# Patient Record
Sex: MALE | Race: WHITE
[De-identification: names, ages, dates, MRNs, and addresses within clinical notes are randomized per-mention and may not be internally consistent; named-entity substitution may affect disease eponyms.]

---

## 2021-08-06 ENCOUNTER — HOSPITAL ENCOUNTER (INPATIENT)
Dept: HOSPITAL 53 - M ED | Age: 21
LOS: 6 days | Discharge: HOME | DRG: 392 | End: 2021-08-12
Attending: INTERNAL MEDICINE | Admitting: FAMILY MEDICINE
Payer: COMMERCIAL

## 2021-08-06 VITALS — WEIGHT: 186.73 LBS | HEIGHT: 70 IN | BODY MASS INDEX: 26.73 KG/M2

## 2021-08-06 DIAGNOSIS — R16.1: ICD-10-CM

## 2021-08-06 DIAGNOSIS — R00.1: ICD-10-CM

## 2021-08-06 DIAGNOSIS — N17.9: ICD-10-CM

## 2021-08-06 DIAGNOSIS — A08.4: Primary | ICD-10-CM

## 2021-08-06 DIAGNOSIS — F17.210: ICD-10-CM

## 2021-08-06 DIAGNOSIS — M62.82: ICD-10-CM

## 2021-08-06 DIAGNOSIS — D69.6: ICD-10-CM

## 2021-08-06 LAB
ALBUMIN SERPL BCG-MCNC: 3.9 GM/DL (ref 3.2–5.2)
ALT SERPL W P-5'-P-CCNC: 34 U/L (ref 12–78)
BASOPHILS # BLD AUTO: 0 10^3/UL (ref 0–0.2)
BASOPHILS NFR BLD AUTO: 0.2 % (ref 0–1)
BILIRUB CONJ SERPL-MCNC: 0.1 MG/DL (ref 0–0.2)
BILIRUB SERPL-MCNC: 0.5 MG/DL (ref 0.2–1)
CK SERPL-CCNC: 360 U/L (ref 39–308)
EOSINOPHIL # BLD AUTO: 0 10^3/UL (ref 0–0.5)
EOSINOPHIL NFR BLD AUTO: 0.1 % (ref 0–3)
HCT VFR BLD AUTO: 44.3 % (ref 42–52)
HGB BLD-MCNC: 15.3 G/DL (ref 13.5–17.5)
LIPASE SERPL-CCNC: 112 U/L (ref 73–393)
LYMPHOCYTES # BLD AUTO: 0.8 10^3/UL (ref 1.5–5)
LYMPHOCYTES NFR BLD AUTO: 8.4 % (ref 24–44)
MCH RBC QN AUTO: 31.5 PG (ref 27–33)
MCHC RBC AUTO-ENTMCNC: 34.5 G/DL (ref 32–36.5)
MCV RBC AUTO: 91.2 FL (ref 80–96)
MONOCYTES # BLD AUTO: 1 10^3/UL (ref 0–0.8)
MONOCYTES NFR BLD AUTO: 9.9 % (ref 2–8)
NEUTROPHILS # BLD AUTO: 7.9 10^3/UL (ref 1.5–8.5)
NEUTROPHILS NFR BLD AUTO: 80.9 % (ref 36–66)
PLATELET # BLD AUTO: 156 10^3/UL (ref 150–450)
PROT SERPL-MCNC: 6.9 GM/DL (ref 6.4–8.2)
RBC # BLD AUTO: 4.86 10^6/UL (ref 4.3–6.1)
WBC # BLD AUTO: 9.7 10^3/UL (ref 4–10)

## 2021-08-07 VITALS — DIASTOLIC BLOOD PRESSURE: 86 MMHG | SYSTOLIC BLOOD PRESSURE: 180 MMHG

## 2021-08-07 VITALS — DIASTOLIC BLOOD PRESSURE: 67 MMHG | SYSTOLIC BLOOD PRESSURE: 149 MMHG

## 2021-08-07 VITALS — SYSTOLIC BLOOD PRESSURE: 156 MMHG | OXYGEN SATURATION: 97 % | DIASTOLIC BLOOD PRESSURE: 87 MMHG

## 2021-08-07 VITALS — DIASTOLIC BLOOD PRESSURE: 71 MMHG | SYSTOLIC BLOOD PRESSURE: 151 MMHG

## 2021-08-07 LAB
BUN SERPL-MCNC: 32 MG/DL (ref 7–18)
CALCIUM SERPL-MCNC: 8.1 MG/DL (ref 8.5–10.1)
CHLORIDE SERPL-SCNC: 111 MEQ/L (ref 98–107)
CO2 SERPL-SCNC: 23 MEQ/L (ref 21–32)
CREAT SERPL-MCNC: 3.98 MG/DL (ref 0.7–1.3)
GFR SERPL CREATININE-BSD FRML MDRD: 20.2 ML/MIN/{1.73_M2} (ref 60–?)
GLUCOSE SERPL-MCNC: 93 MG/DL (ref 70–100)
HETEROPH AB SER QL LA: NEGATIVE
MAGNESIUM SERPL-MCNC: 2.4 MG/DL (ref 1.8–2.4)
PHOSPHATE SERPL-MCNC: 4.3 MG/DL (ref 2.5–4.9)
POTASSIUM SERPL-SCNC: 4.4 MEQ/L (ref 3.5–5.1)
RSV RNA NPH QL NAA+PROBE: NEGATIVE
SODIUM SERPL-SCNC: 140 MEQ/L (ref 136–145)

## 2021-08-07 RX ADMIN — SODIUM CHLORIDE SCH UNITS: 4.5 INJECTION, SOLUTION INTRAVENOUS at 20:53

## 2021-08-07 RX ADMIN — DOCUSATE SODIUM SCH MG: 100 CAPSULE, LIQUID FILLED ORAL at 07:55

## 2021-08-07 RX ADMIN — DOCUSATE SODIUM SCH MG: 100 CAPSULE, LIQUID FILLED ORAL at 20:54

## 2021-08-07 RX ADMIN — HYDROMORPHONE HYDROCHLORIDE PRN MG: 1 INJECTION, SOLUTION INTRAMUSCULAR; INTRAVENOUS; SUBCUTANEOUS at 17:31

## 2021-08-07 RX ADMIN — HYDROMORPHONE HYDROCHLORIDE PRN MG: 1 INJECTION, SOLUTION INTRAMUSCULAR; INTRAVENOUS; SUBCUTANEOUS at 10:41

## 2021-08-07 RX ADMIN — HYDROMORPHONE HYDROCHLORIDE PRN MG: 1 INJECTION, SOLUTION INTRAMUSCULAR; INTRAVENOUS; SUBCUTANEOUS at 14:02

## 2021-08-07 RX ADMIN — SODIUM CHLORIDE, POTASSIUM CHLORIDE, SODIUM LACTATE AND CALCIUM CHLORIDE SCH MLS/HR: 600; 310; 30; 20 INJECTION, SOLUTION INTRAVENOUS at 20:21

## 2021-08-07 RX ADMIN — SODIUM CHLORIDE, POTASSIUM CHLORIDE, SODIUM LACTATE AND CALCIUM CHLORIDE SCH MLS/HR: 600; 310; 30; 20 INJECTION, SOLUTION INTRAVENOUS at 12:41

## 2021-08-07 RX ADMIN — NICOTINE SCH PATCH: 14 PATCH, EXTENDED RELEASE TRANSDERMAL at 09:57

## 2021-08-07 RX ADMIN — SODIUM CHLORIDE SCH MLS/HR: 9 INJECTION, SOLUTION INTRAVENOUS at 10:02

## 2021-08-07 RX ADMIN — SODIUM CHLORIDE SCH MLS/HR: 9 INJECTION, SOLUTION INTRAVENOUS at 03:02

## 2021-08-07 RX ADMIN — SODIUM CHLORIDE SCH UNITS: 4.5 INJECTION, SOLUTION INTRAVENOUS at 14:06

## 2021-08-07 RX ADMIN — KETOROLAC TROMETHAMINE PRN MG: 30 INJECTION, SOLUTION INTRAMUSCULAR at 04:33

## 2021-08-07 RX ADMIN — HYDROMORPHONE HYDROCHLORIDE PRN MG: 1 INJECTION, SOLUTION INTRAMUSCULAR; INTRAVENOUS; SUBCUTANEOUS at 20:53

## 2021-08-07 RX ADMIN — CEFTRIAXONE SCH MLS/HR: 1 INJECTION, POWDER, FOR SOLUTION INTRAMUSCULAR; INTRAVENOUS at 03:03

## 2021-08-07 RX ADMIN — KETOROLAC TROMETHAMINE PRN MG: 30 INJECTION, SOLUTION INTRAMUSCULAR at 04:07

## 2021-08-07 NOTE — REPVR
PROCEDURE INFORMATION: 

Exam: US Retroperitoneal; Complete; Kidneys and Bladder 

Exam date and time: 8/7/2021 5:59 AM 

Age: 20 years old 

Clinical indication: Condition or disease; Other: Jeff 



TECHNIQUE: 

Imaging protocol: Real-time ultrasound of the retroperitoneum with image 

documentation. Complete exam focused on the kidneys and bladder. 



COMPARISON: 

CT ABD PELVIS W/O CONTRAST 8/7/2021 12:31 AM 



FINDINGS: 

Right kidney: Right kidney measures 13.3 x 5.8 x 6.3 cm. Diffuse increased 

parenchymal echogenicity with accentuated corticomedullary differentiation. No 

hydronephrosis. No mass or calculi visualized. 

Left kidney: Left kidney measures 13.6 x 5.8 x 6.5 cm. Diffuse increased 

parenchymal echogenicity with accentuated corticomedullary differentiation. No 

hydronephrosis. No mass or calculi visualized. 

Urinary bladder: Partially decompressed but otherwise unremarkable. 



IMPRESSION: 

1. Bilateral echogenic kidneys, consistent with medical-renal disease. 

2. No hydronephrosis. 



Electronically signed by: Morales Ernst On 08/07/2021  08:03:51 AM

## 2021-08-07 NOTE — HPEPDOC
[facility] Medical History & Physical


Date of Admission


Aug 7, 2021


Date of Service:  Aug 7, 2021





History and Physical


CHIEF COMPLAINT: abdominal pain 





HISTORY OF PRESENT ILLNESS: 19 yo M with no significant PMHx, presented to ER 

c/o worsening lower abominal pain and bilateral flank pain since this morning. 

The pain started as mild, but progressed to 10/10 after eating lunch. He denies 

fevers, chills, melena, chest pain, hematochezia or dysuria. He vomited once, 

bilious contents. Patient states strenous physical activity in the , as 

he is training to enter r[employer] program. In the ER, found to be afebrile, without

leukocytosis. CK mildly elevated at 360. Cr. 2.9 BUN 28. CT abdomen showing 

splenomegaly and perinephric fat stranding suggestive of pyelonephritis. UA+ 

blood, LE, WBC. Patient was started on IV ceftriaxone and IVF. Will be admitted 

to hospitalist service for management of abdminal pain 2/2 possible 

pyelonephritis vs rhabdomyolysis. 





PAST MEDICAL HISTORY:


na





SOCIAL HISTORY:


smokes cigarettes occasionally


drinks etoh on weekends


no illicit drug use


active 





FAMILY HISTORY:


reviewed with patient, no relevant family hx provided





ALLERGIES: Please see below.





REVIEW OF SYSTEMS:


10 point ROS completed, relevant findings in HPI





HOME MEDICATIONS: Please see below. 





PHYSICAL EXAMINATION:


VITAL SIGNS: please see below


GENERAL APPEARANCE: in pain, non toxic


HEENT: PERRLA


CARDIOVASCULAR: RRR, normal S1, S2


LUNGS: CTAB, good air entry bl


ABDOMEN: soft, pain to palpation of lower abdo, mild CVA tenderness, 

splenomegaly.


EXTREMITIES: no deformity, no edema


NEUROLOGICAL: no focal neuro deficits, CN2-12 intact, speech clear, no facial 

asymmetry





LABORATORY DATA: See below.





IMAGING: 





CT abdo pelvis wo contrast (8/7/21):


1. Right renal and upper ureteral adjacent fat stranding, correlate with 


urinalysis for pyelonephritis. 


2. Normal appendix on coronal image 40. 


3. Splenomegaly, measures 15 cm. 





MICROBIOLOGY: Please see below. 





ASSESSMENT: 19 yo M with no significant PMHx, presented to ER c/o worsening 

lower abominal pain and bilateral flank pain since this morning. The pain 

started as mild, but progressed to 10/10 after eating lunch. He denies fevers, 

chills, melena, chest pain, hematochezia or dysuria. He vomited once, bilious 

contents. Patient states strenous physical activity in the , as he is 

training to enter [employer] program. In the ER, found to be afebrile, without 

leukocytosis. CK mildly elevated at 360. Cr. 2.9 BUN 28. CT abdomen showing 

perinephric fat stranding suggestive of pyelonephritis. UA+ blood, LE, WBC. 

Patient was started on IV ceftriaxone and IVF. Will be admitted to hospitalist 

service for management of abdminal pain 2/2 possible pyelonephritis vs 

rhabdomyolysis. In light of abdominal pain and splenomegaly, will check 

mononucleosis screen.





.


PLAN:


# Abdo pain 2/2 possible pyelonephritis vs rhabdomyolsysi vs mononucleosis


- Afebrile, without leukocytosis


- CT showing findings c/w possible pyelo, but also showing splenomegaly. 


- UA suggestive of infection, will start ceftriaxone 1g q24h


- CK only mildly elevated, possibly start of rhabdo. C/w IVF


- given splenomegaly on CT, will check mononucleosis screen


- advised patient to avoid physical contact sports


- pain control with IV morphine





Dispo: pending clinical improvement





Vital Signs





Vital Signs








  Date Time  Temp Pulse Resp B/P (MAP) Pulse Ox O2 Delivery O2 Flow Rate FiO2


 


8/7/21 01:33   16  97 Room Air  


 


8/7/21 00:09 97.7 66  162/80 (107)    











Laboratory Data


Labs 24H


Laboratory Tests 2


8/6/21 21:52: 


Immature Granulocyte % (Auto) 0.5, Neutrophils (%) (Auto) 80.9H, Lymphocytes (%)

(Auto) 8.4L, Monocytes (%) (Auto) 9.9H, Eosinophils (%) (Auto) 0.1, Basophils (%

) (Auto) 0.2, Neutrophils # (Auto) 7.9, Lymphocytes # (Auto) 0.8L, Monocytes # 

(Auto) 1.0H, Eosinophils # (Auto) 0.0, Basophils # (Auto) 0.0, Nucleated Red 

Blood Cells % (auto) 0.0, Total Bilirubin 0.5, Direct Bilirubin 0.1, Aspartate 

Amino Transf (AST/SGOT) 37, Alanine Aminotransferase (ALT/SGPT) 34, Alkaline 

Phosphatase 97, Total Creatine Kinase 360H, Total Protein 6.9, Albumin 3.9, 

Albumin/Globulin Ratio 1.3, Lipase 112


8/6/21 22:02: 


POC Glucose (Misc Panel) 103, POC Sodium (Misc Panel) 141, POC Potassium (Misc 

Panel) 3.6, POC Chloride (Misc Panel) 104, POC Total CO2 (Misc Panel) 23.0, POC 

Blood Urea Nitrogen (Misc Panel 28H, POC Ionized Calcium (Misc Panel) 4.9, POC 

Creatinine (Misc Panel) 2.9H, POC Hematocrit (Misc Panel) 44.0


8/7/21 00:28: 


Coronavirus (COVID-19)(PCR) NEGATIVE, Influenza Type A (RT-PCR) NEGATIVE, 

Influenza Type B (RT-PCR) NEGATIVE, Respiratory Syncytial Virus (PCR) NEGATIVE


8/7/21 01:53: 


Urine Color YELLOW, Urine Appearance HAZY, Urine pH 6.0, Urine Specific Gravity 

1.007, Urine Protein 3+H, Urine Glucose (UA) 1+H, Urine Ketones NEGATIVE, Urine 

Blood 1+H, Urine Nitrite NEGATIVE, Urine Bilirubin NEGATIVE, Urine Urobilinogen 

0.2, Urine Leukocyte Esterase 1+H, Urine WBC (Auto) 41H, Urine RBC (Auto) 6H, 

Urine Hyaline Casts (Auto) 1, Urine Bacteria (Auto) NEGATIVE, Urine Squamous 

Epithelial Cells 0, Urine Transitional Epithelial Cells <1, Urine Mucus (Auto) 

SMALL, Urine Sperm (Auto)


CBC/BMP


Laboratory Tests


8/6/21 21:52








Microbiology





Microbiology


8/7/21 Urine Culture, Received


         Pending





Home Medications


No Active Prescriptions or Reported Meds





Allergies


Coded Allergies:  


     No Known Allergies (Unverified , 8/6/21)











CHANDAN REYNOSO MD        Aug 7, 2021 03:01

## 2021-08-07 NOTE — REPVR
PROCEDURE INFORMATION: 

Exam: CT Abdomen And Pelvis Without Contrast 

Exam date and time: 8/7/2021 12:33 AM 

Age: 20 years old 

Clinical indication: Abdominal pain; Localized; Right lower quadrant (rlq); 

Additional info: R/O appy, fabian, rhabdo 



TECHNIQUE: 

Imaging protocol: Computed tomography of the abdomen and pelvis without 

contrast. 

Radiation optimization: All CT scans at this facility use at least one of these 

dose optimization techniques: automated exposure control; mA and/or kV 

adjustment per patient size (includes targeted exams where dose is matched to 

clinical indication); or iterative reconstruction. 



COMPARISON: 

No relevant prior studies available. 



FINDINGS: 

Liver: Normal. No mass. 

Gallbladder and bile ducts: Normal. No calcified stones. No ductal dilation. 

Pancreas: Normal. No ductal dilation. 

Spleen: Splenomegaly, measures 15 cm. 

Adrenal glands: Normal. No mass. 

Kidneys and ureters: Right renal and upper ureteral adjacent fat stranding, 

correlate with urinalysis for pyelonephritis. No renal hydronephrosis. 

Stomach and bowel: Unremarkable. No obstruction. No mucosal thickening. 

Appendix: Normal appendix on coronal image 40. 



Intraperitoneal space: Unremarkable. No free air. No significant fluid 

collection. 

Vasculature: Unremarkable. No abdominal aortic aneurysm. 

Lymph nodes: Unremarkable. No enlarged lymph nodes. 

Urinary bladder: Unremarkable as visualized. 

Reproductive: Unremarkable as visualized. 

Bones/joints: Unremarkable. No acute fracture. 

Soft tissues: Unremarkable. 



IMPRESSION: 

1. Right renal and upper ureteral adjacent fat stranding, correlate with 

urinalysis for pyelonephritis. 

2. Normal appendix on coronal image 40. 

3. Splenomegaly, measures 15 cm. 



Electronically signed by: Adrian Grewal On 08/07/2021  01:50:27 AM

## 2021-08-08 VITALS — SYSTOLIC BLOOD PRESSURE: 161 MMHG | DIASTOLIC BLOOD PRESSURE: 77 MMHG

## 2021-08-08 VITALS — SYSTOLIC BLOOD PRESSURE: 144 MMHG | DIASTOLIC BLOOD PRESSURE: 76 MMHG

## 2021-08-08 VITALS — SYSTOLIC BLOOD PRESSURE: 140 MMHG | DIASTOLIC BLOOD PRESSURE: 90 MMHG

## 2021-08-08 VITALS — SYSTOLIC BLOOD PRESSURE: 148 MMHG | DIASTOLIC BLOOD PRESSURE: 98 MMHG

## 2021-08-08 VITALS — OXYGEN SATURATION: 97 %

## 2021-08-08 VITALS — DIASTOLIC BLOOD PRESSURE: 86 MMHG | SYSTOLIC BLOOD PRESSURE: 140 MMHG

## 2021-08-08 VITALS — OXYGEN SATURATION: 98 %

## 2021-08-08 LAB
AMYLASE SERPL-CCNC: 38 U/L (ref 25–115)
APPEARANCE UR: CLEAR
BACTERIA UR QL AUTO: NEGATIVE
BILIRUB UR QL STRIP.AUTO: NEGATIVE
BUN SERPL-MCNC: 38 MG/DL (ref 7–18)
C3 SERPL-MCNC: 99 MG/DL (ref 90–180)
C4 SERPL-MCNC: 18 MG/DL (ref 10–40)
CALCIUM SERPL-MCNC: 7.7 MG/DL (ref 8.5–10.1)
CHLORIDE SERPL-SCNC: 110 MEQ/L (ref 98–107)
CO2 SERPL-SCNC: 23 MEQ/L (ref 21–32)
CREAT SERPL-MCNC: 5.01 MG/DL (ref 0.7–1.3)
CREAT UR-MCNC: 74.2 MG/DL
GFR SERPL CREATININE-BSD FRML MDRD: 15.5 ML/MIN/{1.73_M2} (ref 60–?)
GLUCOSE SERPL-MCNC: 94 MG/DL (ref 70–100)
GLUCOSE UR QL STRIP.AUTO: NEGATIVE MG/DL
HCT VFR BLD AUTO: 37.4 % (ref 42–52)
HGB BLD-MCNC: 12.9 G/DL (ref 13.5–17.5)
HGB UR QL STRIP.AUTO: (no result)
KETONES UR QL STRIP.AUTO: NEGATIVE MG/DL
LEUKOCYTE ESTERASE UR QL STRIP.AUTO: NEGATIVE
LIPASE SERPL-CCNC: 94 U/L (ref 73–393)
MAGNESIUM SERPL-MCNC: 2.3 MG/DL (ref 1.8–2.4)
MCH RBC QN AUTO: 31.7 PG (ref 27–33)
MCHC RBC AUTO-ENTMCNC: 34.5 G/DL (ref 32–36.5)
MCV RBC AUTO: 91.9 FL (ref 80–96)
MUCOUS THREADS URNS QL MICRO: (no result)
NITRITE UR QL STRIP.AUTO: NEGATIVE
PH UR STRIP.AUTO: 5 UNITS (ref 5–9)
PHOSPHATE SERPL-MCNC: 4.1 MG/DL (ref 2.5–4.9)
PLATELET # BLD AUTO: 107 10^3/UL (ref 150–450)
POTASSIUM SERPL-SCNC: 4 MEQ/L (ref 3.5–5.1)
PROT SERPL-MCNC: 5.7 GM/DL (ref 6.4–8.2)
PROT UR QL STRIP.AUTO: NEGATIVE MG/DL
PROT UR-MCNC: 15.2 MG/DL (ref 0–12)
RBC # BLD AUTO: 4.07 10^6/UL (ref 4.3–6.1)
RBC # UR AUTO: 1 /HPF (ref 0–3)
SODIUM SERPL-SCNC: 138 MEQ/L (ref 136–145)
SP GR UR STRIP.AUTO: 1 (ref 1–1.03)
SQUAMOUS #/AREA URNS AUTO: 0 /HPF (ref 0–6)
UROBILINOGEN UR QL STRIP.AUTO: 0.2 MG/DL (ref 0–2)
WBC # BLD AUTO: 7.2 10^3/UL (ref 4–10)
WBC #/AREA URNS AUTO: 6 /HPF (ref 0–3)

## 2021-08-08 RX ADMIN — CEFTRIAXONE SCH MLS/HR: 1 INJECTION, POWDER, FOR SOLUTION INTRAMUSCULAR; INTRAVENOUS at 03:24

## 2021-08-08 RX ADMIN — DIATRIZOATE MEGLUMINE AND DIATRIZOATE SODIUM SCH ML: 600; 100 SOLUTION ORAL; RECTAL at 13:14

## 2021-08-08 RX ADMIN — SODIUM CHLORIDE SCH UNITS: 4.5 INJECTION, SOLUTION INTRAVENOUS at 17:15

## 2021-08-08 RX ADMIN — METRONIDAZOLE SCH MLS/HR: 500 INJECTION, SOLUTION INTRAVENOUS at 18:22

## 2021-08-08 RX ADMIN — SODIUM CHLORIDE, POTASSIUM CHLORIDE, SODIUM LACTATE AND CALCIUM CHLORIDE SCH MLS/HR: 600; 310; 30; 20 INJECTION, SOLUTION INTRAVENOUS at 03:24

## 2021-08-08 RX ADMIN — HYDROMORPHONE HYDROCHLORIDE PRN MG: 1 INJECTION, SOLUTION INTRAMUSCULAR; INTRAVENOUS; SUBCUTANEOUS at 14:50

## 2021-08-08 RX ADMIN — SODIUM CHLORIDE SCH UNITS: 4.5 INJECTION, SOLUTION INTRAVENOUS at 07:15

## 2021-08-08 RX ADMIN — DEXTROSE MONOHYDRATE SCH MG: 50 INJECTION, SOLUTION INTRAVENOUS at 12:22

## 2021-08-08 RX ADMIN — NICOTINE SCH PATCH: 14 PATCH, EXTENDED RELEASE TRANSDERMAL at 07:55

## 2021-08-08 RX ADMIN — HYDROMORPHONE HYDROCHLORIDE PRN MG: 1 INJECTION, SOLUTION INTRAMUSCULAR; INTRAVENOUS; SUBCUTANEOUS at 11:28

## 2021-08-08 RX ADMIN — HYDROMORPHONE HYDROCHLORIDE PRN MG: 1 INJECTION, SOLUTION INTRAMUSCULAR; INTRAVENOUS; SUBCUTANEOUS at 01:02

## 2021-08-08 RX ADMIN — HYDROMORPHONE HYDROCHLORIDE PRN MG: 1 INJECTION, SOLUTION INTRAMUSCULAR; INTRAVENOUS; SUBCUTANEOUS at 07:56

## 2021-08-08 RX ADMIN — DOCUSATE SODIUM SCH MG: 100 CAPSULE, LIQUID FILLED ORAL at 22:02

## 2021-08-08 RX ADMIN — HYDROMORPHONE HYDROCHLORIDE PRN MG: 1 INJECTION, SOLUTION INTRAMUSCULAR; INTRAVENOUS; SUBCUTANEOUS at 18:22

## 2021-08-08 RX ADMIN — HYDROMORPHONE HYDROCHLORIDE PRN MG: 1 INJECTION, SOLUTION INTRAMUSCULAR; INTRAVENOUS; SUBCUTANEOUS at 22:10

## 2021-08-08 RX ADMIN — DIATRIZOATE MEGLUMINE AND DIATRIZOATE SODIUM SCH ML: 600; 100 SOLUTION ORAL; RECTAL at 13:13

## 2021-08-08 RX ADMIN — DOCUSATE SODIUM SCH MG: 100 CAPSULE, LIQUID FILLED ORAL at 07:56

## 2021-08-08 RX ADMIN — HYDROMORPHONE HYDROCHLORIDE PRN MG: 1 INJECTION, SOLUTION INTRAMUSCULAR; INTRAVENOUS; SUBCUTANEOUS at 04:46

## 2021-08-08 NOTE — IPNPDOC
Subjective


Date Seen


The patient was seen on 8/8/21.





Subjective


Chief Complaint/HPI


Patient seen and examined at bedside this morning.  He continues to complain of 

abdominal pain mainly around the umbilical region with associated nausea.  He 

also reported feeling that he is not urinating the amount he should be with the 

fluids that he is receiving.  However, he denies change in frequency, dysuria, 

and changes in stream.  He denies chest pain and shortness of breath.


Other systems


10 point review of system was negative except for what is noted in the HPI





Objective


Physical Examination


Other physical findings


eneral: Lying in bed, no acute distress


Head/Neck/Throat: Trachea midline, mucous membranes moist


Eyes: Sclera anicteric, no erythema or discharge appreciated bilaterally


Thorax: Normal respiratory effort on room air, lungs clear to auscultation 

bilaterally, no wheezes/rales/rhonchi


Cardiovascular: Normal rate, regular rhythm, normal S1, S2; no S3, S4, 

rubs/gallops/murmurs


Abdomen: Bowel sounds present, soft, tenderness reported with palpation which is

diffuse


Genitourinary: No CVA tenderness, no Lainez in place


Musculoskeletal: Moving all extremities, no edema


Skin: Warm, dry


Neurologic: AAOx3, speech fluent and goal-directed, no focal deficits, grossly 

intact





Assessment /Plan


Plan/VTE


VTE Prophylaxis Ordered?:  Yes





Plan


#Rhabdomyolysis


-Endorses doing strenuous activity (works out twice a day for couple of hours), 

and drinking heavily on the weekend. Likely the reason for his rhabdomyolysis. 

Continue with IV fluids.


-Symptomatic management with pain medications.


-F/u on urine tox. screen.





#Abdominal pain


-Continues to complain of moderate amount of abdominal pain.  Initial CT scan 

showed right renal and upper urethra adjacent fat stranding suggestive of po

ssible pyelonephritis -patient does not have CVA tenderness, fever, leukocytosis

and urinalysis did not show evidence of urinary tract infection.  Therefore 

pyelonephritis is of low suspicion at this time and would not explain his 

abdominal pain.


-CT scan did show splenomegaly, mono screen is negative.  This would not explain

the extent of his pain.


-Lipase was negative.  


-He has been started on PPI prophylactically.  We will repeat a CT scan of the 

abdomen with p.o. contrast (definitely avoid IV contrast). 





#JACOB


-Likely secondary to underlying rhabdomyolysis - AIN/ATN; he also received Carmela

dol which exacerbated kidney injury.  We will continue with IV fluids. Monitor 

urine output. -Renal ultrasound showed echogenic kidneys, unclear explanation 

for this.


-Avoid NSAIDs/nephrotoxic medications.


-Nephrology input appreciated





#Nicotine use


-Nicotine patch provided





#Dvt ppx 


-Heparin subcu





VS, I&O, 24H, Fishbone


Vital Signs/I&O





Vital Signs








  Date Time  Temp Pulse Resp B/P (MAP) Pulse Ox O2 Delivery O2 Flow Rate FiO2


 


8/8/21 04:56   93   Nasal Cannula 0.5 


 


8/8/21 04:46     96   


 


8/8/21 04:44 98.8 60  144/76 (98)    














I&O- Last 24 Hours up to 6 AM 


 


 8/8/21





 06:00


 


Intake Total 2650 ml


 


Output Total 3175 ml


 


Balance -525 ml











Laboratory Data


24H LABS


Laboratory Tests 2


8/7/21 07:20: 


Anion Gap 6L, Glomerular Filtration Rate 20.2L, Calcium Level 8.1L, Phosphorus 

Level 4.3, Magnesium Level 2.4


8/8/21 05:08: 


Anion Gap 5L, Glomerular Filtration Rate 15.5L, Calcium Level 7.7L, Phosphorus 

Level 4.1, Magnesium Level 2.3, Nucleated Red Blood Cells % (auto) 0.0


CBC/BMP


Laboratory Tests


8/7/21 07:20








8/8/21 05:08








Microbiology





Microbiology


8/7/21 Blood Culture - Preliminary, Resulted


         No growth after 24 hours . All specim...


8/7/21 Blood Culture - Preliminary, Resulted


         No growth after 24 hours . All specim...


8/7/21 Urine Culture, Received


         Pending











JASS SCHMITZ M.D.            Aug 8, 2021 06:23

## 2021-08-08 NOTE — REP
INDICATION:

Generalized abdominal pain, rebound tenderness.



COMPARISON:

Abdomen/pelvis CT dated 08/07/2021 without IV or bowel contrast.



TECHNIQUE:

Abdomen/pelvis CT with bowel contrast, without IV contrast.



FINDINGS:

At the time of scanning the bowel contrast has reached distal small bowel loops,

terminal ileum and cecum and ascending colon.

The appendix again has a normal appearance.

There is a small volume of ascites in the pelvis and a small volume of ascites

posterior to the ascending colon.  There is focal wall thickening of the mid

descending colon, nonspecific, but could represent infectious versus inflammatory

colitis in the appropriate clinical setting.

There is no pneumoperitoneum.  However, there are 2 collections of tiny air bubbles

there are 3 collections of tiny air bubbles in the anterior abdominal wall just

lateral and slightly inferior to the umbilicus, 2 on the right and 1 on the left, not

present previously, possibly from subcutaneous injections.  These were not present on

the comparison study.

There are small bilateral pleural effusions as an interval change.

The unenhanced hepatic parenchyma, gallbladder, pancreas are unremarkable.

The spleen is enlarged as previously.

The adrenals, kidneys and abdominal aorta are unchanged and unremarkable.

There is no bowel distention or obstruction.

Pelvis:

There is ascites is described previously.  The bladder is unremarkable.  There is no

adenopathy.



IMPRESSION:

The appendix remains unremarkable.

There are small bilateral pleural effusions as an interval change.

There is a small volume of ascites in the pelvis as an interval change.  There is a

small volume of ascites posterior to the ascending colon as an interval change.

There is focal wall thickening in the mid descending colon, nonspecific, possibly

infectious versus inflammatory colitis.

There are 3 collections of tiny air bubbles in the anterior abdominal wall just

slightly lateral slightly inferior to the umbilicus, not present previously, possibly

from abdominal wall injections.

There is no pneumoperitoneum.





<Electronically signed by James Rosen > 08/08/21 [id number]

## 2021-08-08 NOTE — CR
NEPHROLOGY CONSULTATION

DATE: 08/08/2021



REQUESTING PHYSICIAN:  ____________



CONSULTING PHYSICIAN: Dr. Henderson



REASON FOR CONSULTATION: Management of acute renal failure.



CHIEF COMPLAINT: Patient presented to the hospital yesterday with progressive

abdominal pain.



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 He presented to the hospital

yesterday with progressive abdominal pain. Abdominal pain is generalized. It

starts from the front, goes to the back, it is almost 9/10 in intensity, almost

constant, it started after he ate lunch two days ago. Patient is also

nauseated, he vomited once. He denies any constipation. He denies any blood in

the stools. He never had such an episode before. He does admit to doing

strenuous physical activity in the . Patient denies any history of

active drug abuse. He does admit to drinking on the weekends, but he denies any

heavy alcohol drinking every day. Patient was found to have splenomegaly and

right-sided perinephric stranding on the CAT scan. He was started on I.V.

fluids and I.V. antibiotics. Nephrology service was called because his renal

function is continuously deteriorating despite I.V. fluid hydration and I.V.

antibiotics. His creatinine was 5 today.



I went and saw the patient today morning at the bedside. Patient was having

significant abdominal pain and he was having writhing and tremors because of

the pain. 



PAST MEDICAL HISTORY: No significant past medical history.



SURGICAL HISTORY: No significant past surgical history.



FAMILY HISTORY: No family history of end-stage renal disease requiring

hemodialysis.



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

weekends. He denies any illicit drug abuse. Currently he is not sexually active

and he denies any history of STDs.



ALLERGIES: No known drug allergies. 



REVIEW OF SYSTEMS: 

CONSTITUTIONAL: He denies any fevers and chills.

EYES: He denies any blurry vision, double vision.

ENT: He denies any dysphagia, odynophagia.

CVS: He denies any chest pain or palpitations.

RESPIRATORY: He denies any shortness of breath.

GI: He reports abdominal pain 9/10 in intensity.

GENITOURINARY: He denies any dysuria, hematuria. 

MUSCULOSKELETAL: He denies any muscle aches and pains. 

HEMATOLOGICAL/ONCOLOGICAL: He denies any easy bleeding or bruising.

ENDOCRINE: Denies any history of hyperthyroidism or hypothyroidism.

CNS: Denies any strokes or seizures. 

All other review of systems is negative.



PHYSICAL EXAMINATION:

GENERAL: Patient is awake, alert, oriented x3, in moderate painful distress.

VITAL SIGNS: Temperature 99.6 degrees Fahrenheit, blood pressure 148/98, pulse

72, respiratory rate 16, and saturating 98% on room air. 

INTAKE/OUTPUT: Urine output recorded as 2.4 liters yesterday, 2.7 liters so far

today since overnight. Weight in the bed scale is 90.5 kg.

HEAD/NECK: Extraocular muscles intact. Pupils equally round and reactive to

light. Mucous membranes are moist. Neck is supple. There is no JVD.

CARDIOVASCULAR: S1, S2, regular rate. Trace edema of bilateral lower

extremities. 

RESPIRATORY: Chest is clear to auscultation bilaterally. Bilateral equal air

entry.

ABDOMEN: Soft, tender to deep palpation in all quadrants and he has rebound

tenderness as well. Bowel sounds are positive. 

GENITOURINARY: No hernias are noted. 

MUSCULOSKELETAL: No clubbing or cyanosis. Pulses are 2+. 

CNS: No focal deficit. Power is 5/5 in all extremities.



LABORATORY REVIEW: CBC showed WBC 7.2, hemoglobin 12.9, platelets 107,000.

Urinalysis done yesterday showed 3+ protein, 1+ glucose, 1+ blood, 1+ leukocyte

esterase, 41 wbc and 6 rbc. Repeat urinalysis done today showed no protein and

2+ blood in the urine. WBC 6. Random creatinine 74.2. Random total protein

15.2. BMP done today showed sodium 138, potassium 4, chloride 110, bicarb 23,

BUN 38, creatinine 5. Lactic acid 0.8. Calcium 7.7. Phosphorus 4.1. Magnesium

2.3. Amylase 38, lipase 94.



IMMUNOLOGY: C3, C4 within the normal range. Rest of immunology is pending.



SEROLOGY: His EBV is pending. Lyme titers are pending. Influenza A, B and RSV

is negative.



MICROBIOLOGY: Blood cultures are negative. Urine cultures are negative.



IMAGING: A CAT scan of the abdomen and pelvis with oral contrast was done,

which showed appendix is unremarkable, small bilateral pleural effusions, small

volume of ascites in the pelvis. There is small volume of ascites posterior to

the ascending colon as well. No pneumoperitoneum.



CURRENT INPATIENT MEDICATIONS: Patient's medications were all reviewed by

myself. He continues to be on I.V. Ceftriaxone. He was getting Ringer's Lactate

which was stopped by myself. He is also getting Flagyl 500 mg I.V. every 8

hours. He is on Colace 100 mg p.o. twice a day. Heparin was decreased to 5,000

units subcutaneously every 12 hours. He is getting Dilaudid p.r.n. He is on

nicotine patch. I started the patient on Protonix 40 mg I.V. daily. Of note:

Patient was getting Toradol 15 mg I.V. every 6 hours p.r.n. and he was given

two doses of 15 mg yesterday. 



ASSESSMENT AND PLAN:

  1.  Acute nonoliguric renal failure: Patient came into the hospital with a

      point of care creatinine of 2.9 and elevated BUN, however, creatinine kept

      rising. Most likely initial bump in the creatinine was secondary to

      dehydration and nausea, however, this bump is secondary to use of Toradol

      I.V. for abdominal pain. Patient initially had proteinuria on urinalysis,

      but repeat urinalysis done today shows no significant proteinuria. His

      CPK was elevated, but it is very mildly elevated. There is no urgent need

      of continuous I.V. fluid hydration especially given the patient has small

      effusions and mild amount of ascites. Patient is drinking enough liquids

      orally. Avoid further use of NSAID pain medications. I am hopeful that his

      renal function should improve, however since he is a young patient, I have

      ordered autoimmune serologies anyway and if renal function does not start

      improving over the next 24 to 48 hours, I would do the biopsy on this

      patient. 

  2.  Abdominal pain: No clear etiology is known at this time. The second time

      his CAT scan was done with oral contrast; the only positive finding is

      some fluid in the abdomen. He is empirically being covered with I.V.

      Ceftriaxone and Flagyl. Continue pain medications with opioids. Avoid

      NSAIDS at this time. I have started the patient on I.V. Protonix as well.

  3.  Splenomegaly: Mono screen is negative. Avoid contact sports and exercise

      when patient is discharged home. 

  4.  DVT prophylaxis: Patient is getting Heparin subcutaneously, however

      because of renal failure, I have decreased the dose to twice a day.



Thank you for involving me in the care of this patient. I shall be happy to

follow the patient along with you tomorrow morning.

## 2021-08-09 VITALS — OXYGEN SATURATION: 96 %

## 2021-08-09 VITALS — DIASTOLIC BLOOD PRESSURE: 89 MMHG | SYSTOLIC BLOOD PRESSURE: 146 MMHG

## 2021-08-09 VITALS — SYSTOLIC BLOOD PRESSURE: 148 MMHG | DIASTOLIC BLOOD PRESSURE: 92 MMHG

## 2021-08-09 VITALS — SYSTOLIC BLOOD PRESSURE: 143 MMHG | DIASTOLIC BLOOD PRESSURE: 81 MMHG

## 2021-08-09 VITALS — DIASTOLIC BLOOD PRESSURE: 85 MMHG | SYSTOLIC BLOOD PRESSURE: 146 MMHG

## 2021-08-09 VITALS — OXYGEN SATURATION: 98 %

## 2021-08-09 LAB
ALBUMIN SERPL BCG-MCNC: 2.9 GM/DL (ref 3.2–5.2)
ALT SERPL W P-5'-P-CCNC: 19 U/L (ref 12–78)
BILIRUB SERPL-MCNC: 0.4 MG/DL (ref 0.2–1)
BUN SERPL-MCNC: 31 MG/DL (ref 7–18)
CALCIUM SERPL-MCNC: 7.8 MG/DL (ref 8.5–10.1)
CHLORIDE SERPL-SCNC: 113 MEQ/L (ref 98–107)
CK SERPL-CCNC: 50 U/L (ref 39–308)
CO2 SERPL-SCNC: 25 MEQ/L (ref 21–32)
CREAT SERPL-MCNC: 3.46 MG/DL (ref 0.7–1.3)
EBV NA IGG SER-ACNC: 115 U/ML (ref 0–17.9)
GFR SERPL CREATININE-BSD FRML MDRD: 23.7 ML/MIN/{1.73_M2} (ref 60–?)
GLUCOSE SERPL-MCNC: 108 MG/DL (ref 70–100)
HBV CORE IGM SER QL: NEGATIVE
HBV SURFACE AB SER QL: POSITIVE
HBV SURFACE AB SER-ACNC: NEGATIVE M[IU]/ML
HCT VFR BLD AUTO: 38.8 % (ref 42–52)
HCV AB SER QL: 0.1 INDEX (ref ?–0.8)
HGB BLD-MCNC: 13.4 G/DL (ref 13.5–17.5)
MAGNESIUM SERPL-MCNC: 2.3 MG/DL (ref 1.8–2.4)
MCH RBC QN AUTO: 31.4 PG (ref 27–33)
MCHC RBC AUTO-ENTMCNC: 34.5 G/DL (ref 32–36.5)
MCV RBC AUTO: 90.9 FL (ref 80–96)
PHOSPHATE SERPL-MCNC: 4 MG/DL (ref 2.5–4.9)
PLATELET # BLD AUTO: 117 10^3/UL (ref 150–450)
POTASSIUM SERPL-SCNC: 4.3 MEQ/L (ref 3.5–5.1)
PROT SERPL-MCNC: 5.4 GM/DL (ref 6.4–8.2)
RBC # BLD AUTO: 4.27 10^6/UL (ref 4.3–6.1)
SODIUM SERPL-SCNC: 142 MEQ/L (ref 136–145)
WBC # BLD AUTO: 5.9 10^3/UL (ref 4–10)

## 2021-08-09 RX ADMIN — ONDANSETRON PRN MG: 2 INJECTION INTRAMUSCULAR; INTRAVENOUS at 09:51

## 2021-08-09 RX ADMIN — HYDROMORPHONE HYDROCHLORIDE PRN MG: 1 INJECTION, SOLUTION INTRAMUSCULAR; INTRAVENOUS; SUBCUTANEOUS at 06:36

## 2021-08-09 RX ADMIN — ALUMINUM HYDROXIDE, MAGNESIUM HYDROXIDE, AND SIMETHICONE SCH ML: 200; 200; 20 SUSPENSION ORAL at 17:18

## 2021-08-09 RX ADMIN — DOCUSATE SODIUM SCH MG: 100 CAPSULE, LIQUID FILLED ORAL at 08:12

## 2021-08-09 RX ADMIN — ALUMINUM HYDROXIDE, MAGNESIUM HYDROXIDE, AND SIMETHICONE SCH ML: 200; 200; 20 SUSPENSION ORAL at 23:40

## 2021-08-09 RX ADMIN — ALUMINUM HYDROXIDE, MAGNESIUM HYDROXIDE, AND SIMETHICONE SCH ML: 200; 200; 20 SUSPENSION ORAL at 11:50

## 2021-08-09 RX ADMIN — SODIUM CHLORIDE SCH UNITS: 4.5 INJECTION, SOLUTION INTRAVENOUS at 06:21

## 2021-08-09 RX ADMIN — SODIUM CHLORIDE SCH UNITS: 4.5 INJECTION, SOLUTION INTRAVENOUS at 17:19

## 2021-08-09 RX ADMIN — DEXTROSE MONOHYDRATE SCH MG: 50 INJECTION, SOLUTION INTRAVENOUS at 08:12

## 2021-08-09 RX ADMIN — DOCUSATE SODIUM SCH MG: 100 CAPSULE, LIQUID FILLED ORAL at 21:25

## 2021-08-09 RX ADMIN — ALUMINUM HYDROXIDE, MAGNESIUM HYDROXIDE, AND SIMETHICONE SCH ML: 200; 200; 20 SUSPENSION ORAL at 08:13

## 2021-08-09 RX ADMIN — HYDROMORPHONE HYDROCHLORIDE PRN MG: 1 INJECTION, SOLUTION INTRAMUSCULAR; INTRAVENOUS; SUBCUTANEOUS at 09:52

## 2021-08-09 RX ADMIN — ONDANSETRON PRN MG: 2 INJECTION INTRAMUSCULAR; INTRAVENOUS at 01:26

## 2021-08-09 RX ADMIN — CEFTRIAXONE SCH MLS/HR: 1 INJECTION, POWDER, FOR SOLUTION INTRAMUSCULAR; INTRAVENOUS at 04:00

## 2021-08-09 RX ADMIN — HYDROCODONE BITARTRATE AND ACETAMINOPHEN PRN TAB: 7.5; 325 TABLET ORAL at 17:19

## 2021-08-09 RX ADMIN — HYDROCODONE BITARTRATE AND ACETAMINOPHEN PRN TAB: 7.5; 325 TABLET ORAL at 11:50

## 2021-08-09 RX ADMIN — METRONIDAZOLE SCH MLS/HR: 500 INJECTION, SOLUTION INTRAVENOUS at 17:19

## 2021-08-09 RX ADMIN — HYDROMORPHONE HYDROCHLORIDE PRN MG: 1 INJECTION, SOLUTION INTRAMUSCULAR; INTRAVENOUS; SUBCUTANEOUS at 02:08

## 2021-08-09 RX ADMIN — METRONIDAZOLE SCH MLS/HR: 500 INJECTION, SOLUTION INTRAVENOUS at 09:52

## 2021-08-09 RX ADMIN — METRONIDAZOLE SCH MLS/HR: 500 INJECTION, SOLUTION INTRAVENOUS at 01:25

## 2021-08-09 RX ADMIN — NICOTINE SCH PATCH: 14 PATCH, EXTENDED RELEASE TRANSDERMAL at 08:13

## 2021-08-09 NOTE — IPNPDOC
Subjective


Date Seen


The patient was seen on 8/9/21.





Subjective


Chief Complaint/HPI


Patient seen and examined at bedside this morning.  He continues to complain of 

abdominal pain, that he rated 9/10 in intensity, mainly around the umbilicus 

region.  He reports associated nausea but denied emesis.  He also believes that 

he is not urinating compared to the amount of fluids that he is in taking.  He 

denies headaches, chest pain, and shortness of breath.  Reports his last bowel 

movement was 3 days ago.





Objective


Physical Examination


Other physical findings


General: Lying in bed, no acute distress


Head/Neck/Throat: Trachea midline, mucous membranes moist


Eyes: Sclera anicteric, no erythema or discharge appreciated bilaterally


Thorax: Normal respiratory effort on room air, lungs clear to auscultation 

bilaterally, no wheezes/rales/rhonchi


Cardiovascular: Normal rate, regular rhythm, normal S1, S2; no S3, S4, 

rubs/gallops/murmurs


Abdomen: Bowel sounds present, soft, diffuse tenderness reported with palpation 

greatest in the lower and umbilical region


Genitourinary: No CVA tenderness, no Lainez in place


Musculoskeletal: Moving all extremities, no edema


Skin: Warm, dry


Neurologic: AAOx3, speech fluent and goal-directed, no focal deficits, grossly 

intact





Assessment /Plan


Plan/VTE


VTE Prophylaxis Ordered?:  Yes





Plan


#Rhabdomyolysis


-Endorses doing strenuous activity (works out twice a day for couple of hours), 

and drinking heavily on the weekend. Likely the reason for his rhabdomyolysis. 

Continue with IV fluids.


-Symptomatic management with pain medications.


-F/u on urine tox. screen.





#Abdominal pain/colitis 


-Repeat CT scan focal wall thickening in the right descending colon, nonspecific

and possibly infectious versus inflammatory colitis.  He has been started on 

Flagyl in addition to ceftriaxone. Continue w/ ppi therapy for now (reports use 

of nsaids).  Will ask gastroenterology for evaluation, as patient continues to 

have abdominal pain.





-Initial CT scan did show splenomegaly, mono screen is negative.  Lipase 

negative





-Initial CT scan showed right renal and upper urethra adjacent fat stranding 

suggestive of possible pyelonephritis -patient does not have CVA tenderness, 

fever, leukocytosis and urinalysis did not show evidence of urinary tract 

infection.  Therefore pyelonephritis is of low suspicion at this time and would 

not explain his abdominal pain.





#JACOB


-Likely secondary to underlying rhabdomyolysis - AIN/ATN; he also received 

Toradol which exacerbated kidney injury.  We will continue with IV fluids. 

Monitor urine output. -Renal ultrasound showed echogenic kidneys, unclear 

explanation for this.


-Avoid NSAIDs/nephrotoxic medications.


-Nephrology input appreciated





#Nicotine use


-Nicotine patch provided





#Dvt ppx 


-Heparin subcu





VS, I&O, 24H, Fishbone


Vital Signs/I&O





Vital Signs








  Date Time  Temp Pulse Resp B/P (MAP) Pulse Ox O2 Delivery O2 Flow Rate FiO2


 


8/9/21 02:08   20  97 Room Air  


 


8/8/21 22:00 98.8 59  161/77 (105)    


 


8/8/21 04:56       0.5 














I&O- Last 24 Hours up to 6 AM 


 


 8/9/21





 06:00


 


Intake Total 2415 ml


 


Output Total 3300 ml


 


Balance -885 ml











Laboratory Data


24H LABS


Laboratory Tests 2


8/8/21 12:12: Lactic Acid Level 0.8


8/8/21 12:13: 


Amylase Level 38, Lipase 94


8/8/21 12:14: 


Total Protein (PEP) 5.7L, Complement C3 99, Complement C4 18


8/8/21 13:05: 


Urine Color STRAW, Urine Appearance CLEAR, Urine pH 5.0, Urine Specific Gravity 

1.004, Urine Protein NEGATIVE, Urine Glucose (Auto)(UA) NEGATIVE, Urine Ketones 

(Auto) NEGATIVE, Urine Blood 2+H, Urine Nitrite NEGATIVE, Urine Bilirubin 

NEGATIVE, Urine Urobilinogen 0.2, Urine Leukocyte Esterase (Auto) NEGATIVE, 

Urine WBC (Auto) 6H, Urine RBC (Auto) 1, Urine Hyaline Casts (Auto) 0, Urine 

Bacteria (Auto) NEGATIVE, Urine Squamous Epithelial Cells 0, Urine Mucus (Auto) 

SMALL, Urine Sperm (Auto) , Urine Random Creatinine 74.2, Urine Random Total 

Protein 15.2H


8/9/21 05:33: Nucleated Red Blood Cells % (auto) 0.0


CBC/BMP


Laboratory Tests


8/9/21 05:33








Microbiology





Microbiology


8/7/21 Blood Culture - Preliminary, Resulted


         No Growth after 48 hours. All Specime...


8/7/21 Blood Culture - Preliminary, Resulted


         No Growth after 48 hours. All Specime...


8/7/21 Urine Culture - Final, Complete











JASS SCHMITZ M.D.            Aug 9, 2021 06:08

## 2021-08-09 NOTE — CR.PDOC
General


Date of Consultation:  Aug 9, 2021


Attending Physician:  MARKUS RIVERA MD





Consultation


Gastroenterology Consultation Note





Requesting Physician : Dr. Zabala





Reason for consultation: persistent abdominal pain 





HISTORY OF PRESENT ILLNESS 


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 

Pt states that the abdominal pain started about 1 hour after he ate a sandwich 

from [employer] on Friday afternoon and prior to that he just finished a morning PT

test involving strenuous exercises. The pain is gradual onset and sharp and 

stabbing in nature primarily in the epigastric region with radiation to both 

flank. The pain is constant and has not subsided even after he tried ibuprofen 

and pepto bismol. He also has associated sporadic nausea followed by vomiting 

mostly fluids and the medication, but denies any green colored/bilious contents.

He states that his pain would be somewhat better when hes in lateral decubitus 

position with his legs tucked in. His last BM was Friday morning which was of 

normal stool caliber, no melena and denies hematemesis, GERD, or reflux or cons

tipation. He denies traveling outside of the  but did go to [place] in late 

June to early July but denies veering from his usual day to day activities. He 

also denies any sick contacts recently. In the ER, his labs noted an elevated CK

level, initial urinaylsis shows proteinuria and an JACOB with Cr of 2.9 and a 

previous resolved infection of EBV but no active EBV. He was started on IVF with

improved repeat urinaylsis. CT abd /pelvis without ctx shows R renal and upper 

ureteral adjacent fat stranding and splenomegaly but normal appendix bilateral 

echogenic kidneys, consistent with medical renal disease; no hydronephrosis. 

Repeat CT abd/pelvis with PO ctx shows small volume ascites in pelvis and 

ascites posterior to the ascending colon. Focal wall thickening in mid 

descending colon, possibly infectious vs inflammatory colitis. 3 collections of 

tiny air bubbles in the anterior abd wall. No pneumoperitoneum.





REVIEW OF SYSTEM:


Constitutional: Denies fever, chills, shaking chills, unintentional weight loss,

loss of appetite


HEENT: Denies headache, head injury, neck pain, decreased hearing, vision


changes, nasal discharge, nosebleeds, hoarseness, sore throat, lumps and bumps


in the neck region.


Respiratory: denies cough or sob. Denies hemoptysis.


Cardiovascular: Denies chest pain or discomfort, Denies any palpitations, or any

swelling in her


extremities, PND, orthopnea 


GI: Denies any changes in bowel habits, rectal bleeding, constipation, severe 

9/10 diffuse abdominal pain mildly alleviated with lateral decubitus position 

with legs tucked under belly. Denies GERD, reflux, heartburn. Denies loss of 

appetite or unintentional weight loss


Musculoskeletal: Denies muscle stiffness, aches, fatigue


Neurological: Denies any fainting, seizures, numbness, tingling, tremors,


dizziness, or fainting.





PAST MEDICAL HX: NONE





PAST SURGICAL HX: NONE





PAST FAMILY HX: NO GI related issues





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 

illicit drug use or smoking or chewing tobacco. He drinks ETOH every Friday and 

Saturday 12 beers in one night usually. 





ALL: NKDA





PHYSICAL EXAM: 


VS: afebrile, bp 148/92 (110) 100% RA, RR 14, pulse 61


General awake alert oriented appropriate mood and affect speech is clear able to

speak in full sentences without any accessory muscle use or retractions


HEENT sclera clear anicteric pupils equal round reactive to light mucous 

membrane moist no lesions seen tongue is midline


Neck/lymph supple no tracheal deviation or mass no evidence of bruit unable to 

palpate lymphadenopathy


Cardiac normal S1-S2 no significant audible murmur rubs or gallop no evidence of

elevated JVP no peripheral edema bilaterally


Pulmonary no wheezing rhonci rales


Abdomen: Belly is mildy distended, guarding and rigidity on palpation. Tender

ness on percussion in lower abdomen. rebound tenderness across the lower abdomen

in both quadrants. decreased bowel sounds but present.


 CVA tenderness bilateral


Extremities no cyanosis clubbing or bruising or calf tenderness





Assessment and Plan: 


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 

Pt states that the abdominal pain started about 1 hour after he ate a sandwich 

from [employer] on Friday afternoon and prior to that he just finished a morning PT

test involving strenuous exercises. The pain is gradual onset and sharp and 

stabbing in nature primarily in the epigastric region with radiation to both 

flank. The pain is constant and has not subsided even after he tried ibuprofen 

and pepto bismol. He also has associated sporadic nausea followed by vomiting 

mostly fluids and the medication, but denies any green colored/bilious contents.

He states that his pain would be somewhat better when hes in lateral decubitus 

position with his legs tucked in. His last BM was Friday morning which was of 

normal stool caliber, no melena and denies hematemesis, GERD, or reflux or 

constipation. He denies traveling outside of the  but did go to [place] in 

late June to early July but denies veering from his usual day to day activities.

He also denies any sick contacts recently. CT abd /pelvis without ctx shows R 

renal and upper ureteral adjacent fat stranding and splenomegaly but normal 

appendix bilateral echogenic kidneys, consistent with medical renal disease; no 

hydronephrosis. Repeat CT abd/pelvis with PO ctx shows small volume ascites in 

pelvis and ascites posterior to the ascending colon. Focal wall thickening in 

mid descending colon, possibly infectious vs inflammatory colitis. 3 collections

of tiny air bubbles in the anterior abd wall. No pneumoperitoneum. Pt was 

admitted under hospitalist team and started on abx and IVF and made NPO and pain

control with dilaudid and IV toradol for nausea and vomiting; however, pt has 

persistent 9/10 abdominal pain and GI service was consulted for further mgmt. 





1. Abdominal pain- Findings of colitis on CT abd/pelvis with po ctx could be 

nonspecific and could be due to intestinal contraction , less likely infectious 

colitis due to his overall presentation. Other differentials for his 

presentation with positive CVA tenderness bilaterally include pyelonephritis, 

although lower on the differential as patient does not have leukocytosis and 

afebrile. Given his continual elevation of Cr despite IVF, it's noted that an 

autoimmune workup is ordered per nephrology. Appreciate their recommendations. 

In the meantime, continue with protonix, pain control, antibiotics with rocephin

and flagyl and monitor his symptoms. If it continues to worsen despite the 

aforementioned, Patient may benefit from an EGD to r/o ulcers or other causes 

for his abdominal pain.





2. Splenomegaly with thrombocytopenia- Positive IGG for EBV, negative for 

igM/active EBV infection. In light of his new onset thrombocytopenia and 

splenomegaly on imaging, will consider ordering a peripheral smear, LDH, 

haptoglobin, to r./o hemolytic process. 





Thank you for the consultation. If further questions, please don't hesitate to 

call.





Vital Signs/I&O





Vital Signs








  Date Time  Temp Pulse Resp B/P (MAP) Pulse Ox O2 Delivery O2 Flow Rate FiO2


 


8/9/21 14:22   16     


 


8/9/21 11:50      Room Air  


 


8/9/21 08:00 98.9 61  148/92 (110) 100   


 


8/8/21 04:56       0.5 














I&O- Last 24 Hours up to 6 AM 


 


 8/9/21





 06:00


 


Intake Total 2415 ml


 


Output Total 3300 ml


 


Balance -885 ml











Laboratory Data


Labs 24H


Laboratory Tests 2


8/9/21 05:33: 


Nucleated Red Blood Cells % (auto) 0.0, Anion Gap 4L, Glomerular Filtration Rate

23.7L, Calcium Level 7.8L, Phosphorus Level 4.0, Magnesium Level 2.3, Total 

Bilirubin 0.4, Aspartate Amino Transf (AST/SGOT) 18, Alanine Aminotransferase 

(ALT/SGPT) 19, Alkaline Phosphatase 67, Total Creatine Kinase 50#, Total Protein

5.4#L, Albumin 2.9#L, Albumin/Globulin Ratio 1.2


CBC/BMP


Laboratory Tests


8/9/21 05:33








Microbiology





Microbiology


8/7/21 Blood Culture - Preliminary, Resulted


         No Growth after 48 hours. All Specime...


8/7/21 Blood Culture - Preliminary, Resulted


         No Growth after 48 hours. All Specime...


8/7/21 Urine Culture - Final, Complete





Allergies


Coded Allergies:  


     No Known Allergies (Unverified , 8/6/21)





Home Medications


No Active Prescriptions or Reported Meds





GME ATTESTATION


GME ATTESTATION


My faculty preceptor for this patient encounter was physically present during 

the encounter and was fully available. All aspects of the patient interview, 

examination, medical decision making process, and medical care plan development 

were reviewed and approved by the faculty preceptor. The faculty preceptor is 

aware and concurs with the plan as stated in the body of this note and will 

attest to such by his/her cosignature.











Miriam Resendiz DO                 Aug 9, 2021 14:39

## 2021-08-09 NOTE — IPN
NEPHROLOGY PROGRESS NOTE



DATE:  08/09/2021



SUBJECTIVE: The patient was seen and examined at the bedside today morning. The

patient's IV fluids had stopped yesterday. He is making a significant amount of

urine. He reports that his urine output is good and he started tolerating a

soft diet. However he still reports some abdominal pain. I started the patient

on Mylanta as well. 



OBJECTIVE: 

VITAL SIGNS: Temperature is 99 degrees Fahrenheit, blood pressure 146/85, pulse

is 88, respiratory rate of 20, saturating 98% on room air. 

INTAKE AND OUTPUT: Urine output recorded as 3.1 liters yesterday; 3.3 liters so

far today since overnight.

Weight in the bed scale is 91.5 kg.



PHYSICAL EXAMINATION: 

GENERAL APPEARANCE: The patient is awake, alert, oriented x3, laying in bed in

no apparent distress. 

HEAD AND NECK:  Extraocular muscles intact. Pupils are equally round and

reactive to light. Mucous membranes are moist. Neck is supple. There is no

jugular venous distention. 

CARDIOVASCULAR: S1, S2, regular rate.

EXTREMITIES:  No edema of the bilateral lower extremities. 

RESPIRATORY: Chest is clear to auscultation bilaterally. Bilaterally currently

no rales or rhonchi. 

ABDOMEN: Soft, positive bowel sounds, moderate amount of tenderness in all

quadrants. Some rebound tenderness as well.  

GENITOURINARY: No Lainez catheter at this time. No hernia noted.

MUSCULOSKELETAL: No clubbing, no cyanosis. Pulses are 2+. 

CNS: No focal deficits. Power is 5/5 in all extremities. 



LAB REVIEW: CBC showed a WBC of 5.9, hemoglobin 13.4, platelet count 117.



BMP showed sodium of 142, potassium 4.3, chloride 113, bicarbonate 25, BUN 31,

creatinine is 3.4. Total bilirubin is 0.4. LDH is 189. CPK is 50. 



Serology showed Kushal-Barr virus, IgG antibodies positive, IgM is negative.



Hepatitis B surface antigen is negative. Surface antibody is positive. 



CURRENT INPATIENT MEDICATIONS: The patient's medications were all reviewed by

myself.  I started the patient on Mylanta at 30 mL p.o. q. 6 hourly. He was

also started on IV Flagyl in addition to IV Ceftriaxone. No other significant

change in the medications today.  



ASSESSMENT AND PLAN: 

1.  Acute non oliguric renal failure  The patient is making more than 3 liters

    of urine daily. Renal function is improving. Continue to monitor for

    improvement of renal function. No need of renal biopsy at this time.



2.  Abdominal pain - The patient is currently getting Ceftriaxone, Flagyl,

    Protonix and Mylanta. No significant etiology is known at this time. The

    rest of the management is as per the Medical Team and GI recommendations.

    The patient does not have any evidence of urinary tract infection or

    pyelonephritis at this time. 



3.  Proteinuria - The patient's proteinuria autoimmune serology was ordered

    when I saw the patient. However random protein creatinine ratio is not

    clinically significant and repeat urinalysis done yesterday showed no

    significant proteinuria. I believe we would not need to do a renal biopsy in

    this patient. I have a low suspicion of acute glomerulonephritis now.

## 2021-08-10 VITALS — DIASTOLIC BLOOD PRESSURE: 87 MMHG | SYSTOLIC BLOOD PRESSURE: 139 MMHG

## 2021-08-10 VITALS — DIASTOLIC BLOOD PRESSURE: 80 MMHG | SYSTOLIC BLOOD PRESSURE: 149 MMHG

## 2021-08-10 VITALS — OXYGEN SATURATION: 99 % | DIASTOLIC BLOOD PRESSURE: 85 MMHG | SYSTOLIC BLOOD PRESSURE: 137 MMHG

## 2021-08-10 VITALS — SYSTOLIC BLOOD PRESSURE: 138 MMHG | DIASTOLIC BLOOD PRESSURE: 93 MMHG

## 2021-08-10 VITALS — OXYGEN SATURATION: 100 %

## 2021-08-10 VITALS — OXYGEN SATURATION: 96 %

## 2021-08-10 VITALS — DIASTOLIC BLOOD PRESSURE: 92 MMHG | SYSTOLIC BLOOD PRESSURE: 138 MMHG

## 2021-08-10 VITALS — SYSTOLIC BLOOD PRESSURE: 115 MMHG | DIASTOLIC BLOOD PRESSURE: 50 MMHG

## 2021-08-10 VITALS — OXYGEN SATURATION: 97 %

## 2021-08-10 LAB
ALBUMIN MFR UR ELPH: 56.3 % (ref 55.8–66.1)
ALBUMIN SERPL-MCNC: 3.21 GM/DL (ref 3.29–5.55)
ALPHA1 GLOB MFR SERPL ELPH: 6.6 % (ref 2.9–4.9)
ALPHA1 GLOB SERPL ELPH-MCNC: 0.38 GM/DL (ref 0.17–0.41)
ALPHA2 GLOB SERPL ELPH-MCNC: 0.74 GM/DL (ref 0.42–0.99)
ALPHA2 GLOB SERPL ELPH-MCNC: 13 % (ref 7.1–11.8)
B-GLOBULIN SERPL ELPH-MCNC: 0.34 GM/DL (ref 0.28–0.6)
BETA1 GLOB MFR SERPL ELPH: 6 % (ref 4.7–7.2)
BETA2 GLOB MFR SERPL ELPH: 6.7 % (ref 3.2–6.5)
BETA2 GLOB SERPL ELPH-MCNC: 0.38 GM/DL (ref 0.19–0.55)
BUN SERPL-MCNC: 21 MG/DL (ref 7–18)
CALCIUM SERPL-MCNC: 7.9 MG/DL (ref 8.5–10.1)
CHLORIDE SERPL-SCNC: 111 MEQ/L (ref 98–107)
CO2 SERPL-SCNC: 26 MEQ/L (ref 21–32)
CREAT SERPL-MCNC: 1.91 MG/DL (ref 0.7–1.3)
FIBRINOGEN PPP-MCNC: 534 MG/DL (ref 268–480)
GAMMA GLOB 24H MFR UR ELPH: 11.4 % (ref 11.1–18.8)
GAMMA GLOB SERPL ELPH-MCNC: 0.65 GM/DL (ref 0.65–1.58)
GFR SERPL CREATININE-BSD FRML MDRD: 47.1 ML/MIN/{1.73_M2} (ref 60–?)
GLUCOSE SERPL-MCNC: 95 MG/DL (ref 70–100)
HCT VFR BLD AUTO: 35.9 % (ref 42–52)
HGB BLD-MCNC: 12.6 G/DL (ref 13.5–17.5)
INR PPP: 1.07
MAGNESIUM SERPL-MCNC: 2 MG/DL (ref 1.8–2.4)
MCH RBC QN AUTO: 31.6 PG (ref 27–33)
MCHC RBC AUTO-ENTMCNC: 35.1 G/DL (ref 32–36.5)
MCV RBC AUTO: 90 FL (ref 80–96)
PHOSPHATE SERPL-MCNC: 4.1 MG/DL (ref 2.5–4.9)
PLATELET # BLD AUTO: 125 10^3/UL (ref 150–450)
POTASSIUM SERPL-SCNC: 3.9 MEQ/L (ref 3.5–5.1)
PROT PATTERN SERPL ELPH-IMP: (no result)
PROTHROMBIN TIME: 14.3 SECONDS (ref 12.7–14.5)
RBC # BLD AUTO: 3.99 10^6/UL (ref 4.3–6.1)
SODIUM SERPL-SCNC: 142 MEQ/L (ref 136–145)
WBC # BLD AUTO: 4.4 10^3/UL (ref 4–10)

## 2021-08-10 RX ADMIN — DOCUSATE SODIUM SCH MG: 100 CAPSULE, LIQUID FILLED ORAL at 09:20

## 2021-08-10 RX ADMIN — ALUMINUM HYDROXIDE, MAGNESIUM HYDROXIDE, AND SIMETHICONE SCH ML: 200; 200; 20 SUSPENSION ORAL at 05:32

## 2021-08-10 RX ADMIN — DEXTROSE MONOHYDRATE SCH MG: 50 INJECTION, SOLUTION INTRAVENOUS at 09:20

## 2021-08-10 RX ADMIN — HYDROCODONE BITARTRATE AND ACETAMINOPHEN PRN TAB: 7.5; 325 TABLET ORAL at 07:15

## 2021-08-10 RX ADMIN — HYDROCODONE BITARTRATE AND ACETAMINOPHEN PRN TAB: 7.5; 325 TABLET ORAL at 21:10

## 2021-08-10 RX ADMIN — NICOTINE SCH PATCH: 14 PATCH, EXTENDED RELEASE TRANSDERMAL at 09:19

## 2021-08-10 RX ADMIN — HYDROCODONE BITARTRATE AND ACETAMINOPHEN PRN TAB: 7.5; 325 TABLET ORAL at 01:34

## 2021-08-10 RX ADMIN — DOCUSATE SODIUM SCH MG: 100 CAPSULE, LIQUID FILLED ORAL at 20:53

## 2021-08-10 RX ADMIN — ALUMINUM HYDROXIDE, MAGNESIUM HYDROXIDE, AND SIMETHICONE SCH ML: 200; 200; 20 SUSPENSION ORAL at 18:12

## 2021-08-10 RX ADMIN — CEFTRIAXONE SCH MLS/HR: 1 INJECTION, POWDER, FOR SOLUTION INTRAMUSCULAR; INTRAVENOUS at 03:18

## 2021-08-10 RX ADMIN — ONDANSETRON PRN MG: 2 INJECTION INTRAMUSCULAR; INTRAVENOUS at 02:25

## 2021-08-10 RX ADMIN — ALUMINUM HYDROXIDE, MAGNESIUM HYDROXIDE, AND SIMETHICONE SCH ML: 200; 200; 20 SUSPENSION ORAL at 12:08

## 2021-08-10 RX ADMIN — SODIUM CHLORIDE SCH UNITS: 4.5 INJECTION, SOLUTION INTRAVENOUS at 05:32

## 2021-08-10 RX ADMIN — HYDROCODONE BITARTRATE AND ACETAMINOPHEN PRN TAB: 7.5; 325 TABLET ORAL at 16:32

## 2021-08-10 RX ADMIN — ONDANSETRON PRN MG: 2 INJECTION INTRAMUSCULAR; INTRAVENOUS at 12:08

## 2021-08-10 RX ADMIN — SODIUM CHLORIDE SCH UNITS: 4.5 INJECTION, SOLUTION INTRAVENOUS at 18:12

## 2021-08-10 RX ADMIN — HYDROCODONE BITARTRATE AND ACETAMINOPHEN PRN TAB: 7.5; 325 TABLET ORAL at 12:09

## 2021-08-10 RX ADMIN — METRONIDAZOLE SCH MLS/HR: 500 INJECTION, SOLUTION INTRAVENOUS at 01:44

## 2021-08-10 RX ADMIN — METRONIDAZOLE SCH MLS/HR: 500 INJECTION, SOLUTION INTRAVENOUS at 09:20

## 2021-08-10 RX ADMIN — ONDANSETRON PRN MG: 2 INJECTION INTRAMUSCULAR; INTRAVENOUS at 18:12

## 2021-08-10 NOTE — IPNPDOC
Text Note


Date of Service


The patient was seen on 8/10/21.





NOTE


Subjective:


Patient is 20 year-old  male with no significant past medical history 

who presented to the emergency room with complaints of abdominal discomfort. 

Patient was found to have an elevated creatinine was admitted to the hospital 

service for further evaluation, treatment. Nephrology was called on 

consultation.





Patient was seen and examined at the bedside. Patient was still reporting 

significant lower abdominal discomfort with associated nausea, no vomiting. 

Denies any chest pain, short of breath or palpitations. Reports some loose bowel

movements.





Objective:


Vitals (See below)


General: Lying in bed, appears comfortable, AAOx3


HEENT: NC, AT


CVS: +S1S2


Lungs: Fair air entry b/l, -w/r/r


Abdomen: Soft, ND, mild lower abdominal tenderness 


Extremities: - Edema, - Calf tenderness





Imaging:


CT abdomen / pelvis 8/7:


1. Right renal and upper ureteral adjacent fat stranding, correlate with 

urinalysis for pyelonephritis. 


2. Normal appendix on coronal image 40. 


3. Splenomegaly, measures 15 cm. 





Renal US 8/7:


1. Bilateral echogenic kidneys, consistent with medical-renal disease. 


2. No hydronephrosis. 





CT abdomen / pelvis 8/8:


The appendix remains unremarkable.


There are small bilateral pleural effusions as an interval change.


There is a small volume of ascites in the pelvis as an interval change.  There 

is a small volume of ascites posterior to the ascending colon as an interval 

change.


There is focal wall thickening in the mid descending colon, nonspecific, 

possibly infectious versus inflammatory colitis.


There are 3 collections of tiny air bubbles in the anterior abdominal wall just 

slightly lateral slightly inferior to the umbilicus, not present previously, 

possibly from abdominal wall injections.


There is no pneumoperitoneum.





Assessment and plan:


Abdominal pain - etiology unclear; unlikely 2/2 colitis, unlikely 2/2 UTI


- Afebrile and hemodynamically stable


- No leukocytosis


- Imaging noted above 


- Will DC Ceftriaxone and Flagyl 


- GI on consultation; appreciate their input 





JACOB - likely 2/2 AIN / ATN


- Clinically improving


- Continues to make adequate urine


- Cr trending down


- Will avoid Nephrotoxic medications


- Nephrology on consultation; appreciate their input 





Thrombocytopenia


- No evidence of bleeding


- Will continue to follow trend 





EBV infection (Likely prior)


- Serology reveals IgG positive, but not IgM


- Monospot test negative 


- Imaging consistent with splenomegaly


- c/w Supportive care 


- Will avoid strenuous activity for 2 weeks 





s/p Rhabdomyolysis 





Nicotine use


- c/w Nicotine patch 





GI prophylaxis


- c/w Protonix 





DVT prophylaxis


- c/w Heparin 





Disposition:


- Anticipate DC home in 24-48 hours





VS,Fishbone, I+O


VS, Fishbone, I+O


Laboratory Tests


8/10/21 04:41











Vital Signs








  Date Time  Temp Pulse Resp B/P (MAP) Pulse Ox O2 Delivery O2 Flow Rate FiO2


 


8/10/21 12:09   17     


 


8/10/21 11:35 98.7 58  138/93 (108) 99 Room Air  


 


8/10/21 03:58       1.0 














I&O- Last 24 Hours up to 6 AM 


 


 8/10/21





 06:00


 


Intake Total 2180 ml


 


Output Total 3195 ml


 


Balance -1015 ml

















JUANCARLOS HEATON MD                Aug 10, 2021 12:43

## 2021-08-10 NOTE — IPN
PROGRESS NOTE



DATE:  08/10/2021



SUBJECTIVE: The patient was seen and examined at the bedside today morning.  He

is afebrile, hemodynamically stable.  He still reports some moderate amount of

pain in the abdomen. He is getting opioid pain medication.  He has a very good

urine output.  Renal function is improving.  Creatinine has improved to 1.9

today.  He is tolerating a soft diet. 



OBJECTIVE: Vital signs: Temperature is 98.7 degrees Fahrenheit, blood pressure

138/93, pulse is 58, respiratory rate of 20, saturating 99% on room air. 

Intake and output: Urine output recorded as 4 liters yesterday, 275 ml so far

today.  Weight in the bed scale is 87.3 kg.  



PHYSICAL EXAMINATION:  General: The patient is awake, alert and oriented times

3, lying in bed, in no acute distress. Head and neck examination:  Extraocular

muscles are intact.  Pupils equally round and reactive to light . Mucous

membranes are moist.  Neck is supple. There is no jugular venous distention

(JVD).  Cardiovascular: S1,  S2 regular rate.  No edema of the bilateral lower

extremities.  Respiratory: Chest is clear to auscultation bilaterally. 

Bilateral equal air entry.  No rales or rhonchi.  Abdomen: Soft, mildly tender

to deep palpation.  Musculoskeletal:  No clubbing or cyanosis.  CNS:   No focal

deficit.  Power is 5/5 in all extremities.  



LABORATORY REVIEW:  Complete blood count (CBC) showed a WBC of 4.4, hemoglobin

12.6, platelets are 125. Basic metabolic panel (BMP) showed sodium 142,

potassium 3.9, chloride 111, bicarbonate 26, BUN 21, creatinine is 1.9, it was

3.4 yesterday.  Calcium 7.9, phosphorus 4.1, magnesium is 2.  C-reactive

protein is 1.67.  All other workup is pending so far.



SEROLOGY:  No new serology available at this time.



CURRENT INPATIENT MEDICATIONS:  The patient's medications are all reviewed by

myself.  IV antibiotics have been stopped now.  The patient's diet has been

changed to regular diet now.  



ASSESSMENT AND PLAN: 

  1.  Acute renal failure.  The patient is nonoliguric.  He made more than 4

      liters of urine yesterday. Renal function is improving. Creatinine is

      down to 1.9 now.  

  2.  Abdominal pain. Unknown etiology at this time. The patient's IV

      antibiotics have been stopped and diet has been advanced to a regular

      diet now.

## 2021-08-11 VITALS — SYSTOLIC BLOOD PRESSURE: 152 MMHG | DIASTOLIC BLOOD PRESSURE: 88 MMHG | OXYGEN SATURATION: 92 %

## 2021-08-11 VITALS — OXYGEN SATURATION: 97 %

## 2021-08-11 VITALS — DIASTOLIC BLOOD PRESSURE: 68 MMHG | SYSTOLIC BLOOD PRESSURE: 129 MMHG

## 2021-08-11 VITALS — OXYGEN SATURATION: 98 %

## 2021-08-11 VITALS — OXYGEN SATURATION: 94 %

## 2021-08-11 VITALS — DIASTOLIC BLOOD PRESSURE: 96 MMHG | SYSTOLIC BLOOD PRESSURE: 147 MMHG

## 2021-08-11 VITALS — SYSTOLIC BLOOD PRESSURE: 139 MMHG | DIASTOLIC BLOOD PRESSURE: 69 MMHG

## 2021-08-11 VITALS — OXYGEN SATURATION: 96 %

## 2021-08-11 LAB
ALBUMIN SERPL BCG-MCNC: 3.1 GM/DL (ref 3.2–5.2)
ALT SERPL W P-5'-P-CCNC: 23 U/L (ref 12–78)
B BURGDOR IGG+IGM SER-ACNC: <0.91 ISR (ref 0–0.9)
B BURGDOR IGM SER IA-ACNC: <0.8 INDEX (ref 0–0.79)
BASOPHILS # BLD AUTO: 0 10^3/UL (ref 0–0.2)
BASOPHILS NFR BLD AUTO: 0.5 % (ref 0–1)
BILIRUB SERPL-MCNC: 0.4 MG/DL (ref 0.2–1)
BUN SERPL-MCNC: 14 MG/DL (ref 7–18)
C DIFF TOX GENS STL QL NAA+PROBE: NEGATIVE
C-ANCA TITR SER IF: (no result) TITER
CALCIUM SERPL-MCNC: 8.6 MG/DL (ref 8.5–10.1)
CHLORIDE SERPL-SCNC: 109 MEQ/L (ref 98–107)
CO2 SERPL-SCNC: 28 MEQ/L (ref 21–32)
CREAT SERPL-MCNC: 1.36 MG/DL (ref 0.7–1.3)
CRP SERPL-MCNC: 1.19 MG/DL (ref 0–0.3)
EOSINOPHIL # BLD AUTO: 0.1 10^3/UL (ref 0–0.5)
EOSINOPHIL NFR BLD AUTO: 2.6 % (ref 0–3)
GFR SERPL CREATININE-BSD FRML MDRD: > 60 ML/MIN/{1.73_M2} (ref 60–?)
GLUCOSE SERPL-MCNC: 88 MG/DL (ref 70–100)
HCT VFR BLD AUTO: 38.7 % (ref 42–52)
HGB BLD-MCNC: 13.6 G/DL (ref 13.5–17.5)
HIV 1+2 AB+HIV1 P24 AG SERPL QL IA: NEGATIVE
KAPPA LC FREE SER-MCNC: 24.9 MG/L (ref 3.3–19.4)
KAPPA LC/LAMBDA SER: 1.63 {RATIO} (ref 0.26–1.65)
LAMBDA LC FREE SERPL-MCNC: 15.3 MG/L (ref 5.7–26.3)
LYMPHOCYTES # BLD AUTO: 1.3 10^3/UL (ref 1.5–5)
LYMPHOCYTES NFR BLD AUTO: 33.9 % (ref 24–44)
MAGNESIUM SERPL-MCNC: 1.7 MG/DL (ref 1.8–2.4)
MCH RBC QN AUTO: 30.9 PG (ref 27–33)
MCHC RBC AUTO-ENTMCNC: 35.1 G/DL (ref 32–36.5)
MCV RBC AUTO: 88 FL (ref 80–96)
MONOCYTES # BLD AUTO: 0.5 10^3/UL (ref 0–0.8)
MONOCYTES NFR BLD AUTO: 12.1 % (ref 2–8)
N GONORRHOEA RRNA SPEC QL NAA+PROBE: NEGATIVE
NEUTROPHILS # BLD AUTO: 2 10^3/UL (ref 1.5–8.5)
NEUTROPHILS NFR BLD AUTO: 50.6 % (ref 36–66)
P-ANCA ATYPICAL TITR SER IF: (no result) TITER
P-ANCA TITR SER IF: (no result) TITER
PHOSPHATE SERPL-MCNC: 4.5 MG/DL (ref 2.5–4.9)
PLATELET # BLD AUTO: 141 10^3/UL (ref 150–450)
POTASSIUM SERPL-SCNC: 3.9 MEQ/L (ref 3.5–5.1)
PROT SERPL-MCNC: 5.7 GM/DL (ref 6.4–8.2)
RBC # BLD AUTO: 4.4 10^6/UL (ref 4.3–6.1)
SODIUM SERPL-SCNC: 143 MEQ/L (ref 136–145)
TSH SERPL DL<=0.005 MIU/L-ACNC: 3.19 UIU/ML (ref 0.36–3.74)
WBC # BLD AUTO: 3.9 10^3/UL (ref 4–10)

## 2021-08-11 RX ADMIN — DEXTROSE MONOHYDRATE SCH MG: 50 INJECTION, SOLUTION INTRAVENOUS at 08:34

## 2021-08-11 RX ADMIN — ALUMINUM HYDROXIDE, MAGNESIUM HYDROXIDE, AND SIMETHICONE SCH ML: 200; 200; 20 SUSPENSION ORAL at 00:20

## 2021-08-11 RX ADMIN — DOCUSATE SODIUM SCH MG: 100 CAPSULE, LIQUID FILLED ORAL at 07:44

## 2021-08-11 RX ADMIN — SODIUM CHLORIDE SCH UNITS: 4.5 INJECTION, SOLUTION INTRAVENOUS at 05:22

## 2021-08-11 RX ADMIN — HYDROCODONE BITARTRATE AND ACETAMINOPHEN PRN TAB: 7.5; 325 TABLET ORAL at 05:21

## 2021-08-11 RX ADMIN — SODIUM CHLORIDE SCH UNITS: 4.5 INJECTION, SOLUTION INTRAVENOUS at 16:31

## 2021-08-11 RX ADMIN — NICOTINE SCH PATCH: 14 PATCH, EXTENDED RELEASE TRANSDERMAL at 08:34

## 2021-08-11 RX ADMIN — ONDANSETRON PRN MG: 2 INJECTION INTRAMUSCULAR; INTRAVENOUS at 05:29

## 2021-08-11 NOTE — IPNPDOC
Text Note


Date of Service


The patient was seen on 8/11/21.





NOTE


Subjective:


Patient is 20 year-old  male with no significant past medical history 

who presented to the emergency room with complaints of abdominal discomfort. 

Patient was found to have an elevated creatinine was admitted to the hospital 

service for further evaluation and treatment. Nephrology was called on 

consultation.





Patient was seen and examined at the bedside. Patient reported that his last 

bowel movement was yesterday evening. Since that he is not experiencing any 

further bowel movements. Denies any chest pain, shortness breath, palpitations. 

Currently he denies any nausea, has not vomited overnight. Still reports some 

lower abdominal discomfort that he reports has had some improvement. Denies any 

urinary discomfort.





Objective:


Vitals (See below)


General: Patient is laying flat in bed, appears to be comfortable without any 

acute distress, awake, alert, oriented 3


HEENT: Normocephalic and atraumatic


CVS: +S1S2


Lungs: There appears to be fair air entry bilaterally without evidence of 

wheezing, crackles or rhonchi


Abdomen: Abdomen remains soft without any significant distention. Mild 

tenderness appreciated diffusely


Extremities: No evidence of lower extremity edema





Imaging:


CT abdomen / pelvis 8/7:


1. Right renal and upper ureteral adjacent fat stranding, correlate with 

urinalysis for pyelonephritis. 


2. Normal appendix on coronal image 40. 


3. Splenomegaly, measures 15 cm. 





Renal US 8/7:


1. Bilateral echogenic kidneys, consistent with medical-renal disease. 


2. No hydronephrosis. 





CT abdomen / pelvis 8/8:


The appendix remains unremarkable.


There are small bilateral pleural effusions as an interval change.


There is a small volume of ascites in the pelvis as an interval change.  There 

is a small volume of ascites posterior to the ascending colon as an interval 

change.


There is focal wall thickening in the mid descending colon, nonspecific, 

possibly infectious versus inflammatory colitis.


There are 3 collections of tiny air bubbles in the anterior abdominal wall just 

slightly lateral slightly inferior to the umbilicus, not present previously, 

possibly from abdominal wall injections.


There is no pneumoperitoneum.





XR abdomen 8/11:


1. Possible splenomegaly. 


2. No radiographic evidence of bowel obstruction. 





Assessment and plan:


Abdominal pain - etiology unclear; unlikely 2/2 colitis, unlikely 2/2 UTI


- Afebrile and hemodynamically stable


- No leukocytosis


- Gonorrhoeae / Chlamydia negative 


- Imaging noted above 


- s/p Ceftriaxone and Flagyl 


- C. diff PCR pending 


- Will check CTA abdomen / pelvis; discussed with Nephrology - will hydrate him 

with Normal saline pre/post contrast 


- GI on consultation; appreciate their input; possible EGD / Colonoscopy 

tomorrow 





JACOB - likely 2/2 AIN / ATN


- Clinically improving


- Continues to make adequate urine


- Cr is essentially approaching normal 


- Will avoid Nephrotoxic medications


- Nephrology on consultation; appreciate their input 





Thrombocytopenia


- Improving 


- No evidence of bleeding


- Hepatitis / HIV negative 


- Will continue to follow trend 





EBV infection (Likely prior)


- Serology reveals IgG positive, but not IgM


- Monospot test negative 


- Imaging consistent with splenomegaly


- c/w Supportive care 


- Will avoid strenuous activity for 2 weeks 





Bradycardia 


- Patient has experienced bradycardia at night and early AM; improves when awake


- Denies any CP, SOB or palpitations


- EKG reviewed


- c/w Telemetry 





s/p Rhabdomyolysis 





Nicotine use


- c/w Nicotine patch 





GI prophylaxis


- c/w Protonix 





DVT prophylaxis


- c/w Heparin 





Disposition:


- Anticipate DC home in 24-48 hours





VS,Chalo, I+O


VS, Chalo, I+O


Laboratory Tests


8/11/21 04:55











Vital Signs








  Date Time  Temp Pulse Resp B/P (MAP) Pulse Ox O2 Delivery O2 Flow Rate FiO2


 


8/11/21 06:00     98 Room Air  


 


8/11/21 05:51   16     


 


8/11/21 00:00 98.9 45  152/88 (109)    


 


8/10/21 03:58       1.0 














I&O- Last 24 Hours up to 6 AM 


 


 8/11/21





 06:00


 


Intake Total 1540 ml


 


Output Total 3695 ml


 


Balance -2155 ml

















JUANCARLOS HEATON MD                Aug 11, 2021 10:57

## 2021-08-11 NOTE — REPVR
PROCEDURE INFORMATION: 

Exam: XR Abdomen 

Exam date and time: 8/11/2021 5:16 AM 

Age: 20 years old 

Clinical indication: Abdominal pain; Acute; Additional info: Excruciating 

abdominal pain 



TECHNIQUE: 

Imaging protocol: XR of the abdomen. 

Views: Frontal supine view of the abdomen. 1 View. 



COMPARISON: 

CT ABD/PEL W/PO CONTRAST ONLY 8/8/2021 2:33 PM 



FINDINGS: 

Gastrointestinal tract: The splenic shadow appear to be enlarged. No bowel 

dilation. 

Bones/joints: Unremarkable. 



IMPRESSION: 

1. Possible splenomegaly. 

2. No radiographic evidence of bowel obstruction. 



Electronically signed by: Pancho Hernandez On 08/11/2021  07:04:59 AM

## 2021-08-11 NOTE — ECGEPIP
[facility]

                                       

                                       Test Date:    2021

Pat Name:     BONG BALES            Department:   

Patient ID:   [id number]                 Room:         [place] [address]

Gender:       Male                     Technician:   rf

:          2000               Requested By: JUANCARLOS HEATON 

Order Number: [id number]     Reading MD:   Artis Pereira

                                 Measurements

Intervals                              Axis          

Rate:         52                       P:            22

FL:           162                      QRS:          57

QRSD:         88                       T:            44

QT:           448                                    

QTc:          416                                    

                           Interpretive Statements

sinus bradycardia

Otherwise normal tracing

Faster sinus rate compared with previous day with no current junctional

escape b

beats

Electronically Signed on 2021 8:43:22 EDT by Artis Pereira

## 2021-08-11 NOTE — ECGEPIP
[facility]

                                       

                                       Test Date:    2021-08-10

Pat Name:     BONG BALES            Department:   

Patient ID:   [id number]                 Room:         [place] [address]

Gender:       Male                     Technician:   [id number]

:          2000               Requested By: ALEXA DOUGLAS

Order Number: [id number]     Reading MD:   Artis Pereira

                                 Measurements

Intervals                              Axis          

Rate:         42                       P:            

[place]:                                    QRS:          66

QRSD:         90                       T:            51

QT:           454                                    

QTc:          379                                    

                           Interpretive Statements

underlying sinus bradycardia with intermittent junctional escape rhythm

Otherwise normal

Normal variant at this age especially if physically trained individual

No prior tracing for comparison

Electronically Signed on 2021 8:42:03 EDT by Artis Pereira

## 2021-08-11 NOTE — IPN
PROGRESS NOTE



DATE:  08/11/2021



SUBJECTIVE: Patient was seen and examined at the bedside today morning. His

renal function continues to improve. He is tolerating a regular diet. He

reports that his pain in the abdomen is slightly better today as compared with

yesterday. 



OBJECTIVE: 

VITAL SIGNS: Temperature is 97.9 degrees Fahrenheit, blood pressure is 139/69,

pulse is 48, respiratory rate is 16, saturating 100% on room air.

INTAKE AND OUTPUT:  Urine output recorded as 2.7 liters yesterday, 1250 ml so

far today by the time I saw him. Weight on the bed scale is 85.9 kg.

GENERAL: Patient is awake, alert and oriented x3, laying in bed, in no apparent

distress. 

HEAD AND NECK: Extraocular muscles. Pupils equally round and reactive to light.

Mucous membranes are moist. Neck is supple. There is no JVD.

CARDIOVASCULAR: S1 and S2, regular rate. No edema of the bilateral lower

extremities. 

RESPIRATORY: Chest is clear to auscultation bilaterally.

ABDOMEN: Soft, mildly tender to deep palpation in the epigastrium. He does have

mild rebound tenderness but is getting better.



LABORATORY DATA: CBC showed a WBC of 3.9, hemoglobin 13.6, platelets of

141,000. BMP showed a sodium of 143, potassium 3.9, chloride 109, bicarbonate

28, BUN 14, creatinine is 1.3, it was 1.9 yesterday. Magnesium 1.7. C-reactive

protein is 1.1 which is a lot better than yesterday. Total protein is 5.7,

albumin 3.1. 



Immunology: ARANZA, ANCA, anti-double stranded DNA antibody is negative.

Anti-glomerular basement membrane antibody is pending. Kappa lambda light chain

ratio is normal. Lyme disease titers are negative. 



Imaging: A CT angiogram of the abdomen was done today after rehydration and it

showed no acute vascular abnormality in the abdomen. 



CURRENT INPATIENT MEDICATIONS: Patient's medications were all reviewed by

myself. He got a dose of IV magnesium, antibiotics have all been stopped. He is

getting Bentyl for abdominal pain. No other significant change in the

medications today. 



ASSESSMENT AND PLAN:

  1.  Acute renal failure. Patient's renal function is improving. Creatinine

      has improved to 1.3. All the autoimmune serologies are negative so far.

  2.  Intraabdominal ascites and fluid collection. Repeat CT scan is showing

      improvement. Patient is having a lot of urine output as well. No need for

      diuretic administration at this time. 

  3.  Persistent abdominal pain. Patient's repeat CT scan with IV contrast did

      not show any pathology. Pain is slowly improving. Continue Bentyl and

      Protonix. The rest of the management is as per the medical team.

  4.  Disposition: The patient's renal function is improving close to baseline.

      Nephrology Service is going to sign off at this moment. Please call

      nephrology service for any help with the management of this patient

      during this hospitalization.

## 2021-08-11 NOTE — REP
INDICATION:

Abdominal pain.



COMPARISON:

Abdomen/pelvis CT studies dated 08/07/2021 and 08/08/2021.



TECHNIQUE:

Abdomen/pelvis CT with IV contrast, abdominal CT angiography protocol.



FINDINGS:

There is no calcified atheroma in any of the abdominal or pelvic vessels.

There is no abdominal aortic aneurysm.  There is no periaortic hematoma.

There is no stenosis or occlusion of the superior mesenteric artery or celiac artery

no stenosis or narrowing of the hepatic artery or splenic artery.

No stenosis or occlusion of the right or left renal arteries or of the inferior

mesenteric artery.

No stenosis or occlusion of the right and left common iliac arteries, right or left

external iliac arteries or internal iliac arteries or the right or left common femoral

arteries with the right and left proximal superficial femoral arteries and profundus

femoral arteries.



Abdomen/pelvis CT:

There is a small volume of ascites in the pelvis, as previously.

The small volume of ascites posterior to the ascending colon on 08/08/2021 is no

longer present.

The wall thickening of the descending colon noted on 08/08/2021 has resolved.

There is no pneumoperitoneum.



Again there are collections of tiny air bubbles in the anterior abdominal wall,

similar to the prior study, possibly from injections.



There are small bilateral pleural effusions.  The right pleural effusion is slightly

decreased in size.  The left pleural effusion is unchanged.

The hepatic parenchyma is homogeneous and otherwise unremarkable.

The gallbladder is partially collapsed but otherwise unremarkable.

The pancreas and spleen are unchanged.  Splenomegaly is again noted.  There is no

intra splenic or perisplenic hematoma.

The adrenals are unremarkable.  The kidneys are unremarkable.

There is no bowel distention or obstruction.  No inflammatory changes in the mesentery.

Pelvis:

The bladder is unremarkable.

Small volume of ascites as described.

The lumbar pelvic skeletal structures are unremarkable.



IMPRESSION:

The abdominal and pelvic arteries are unremarkable.  There is no evidence of occlusion

or stenosis as described above.



There is a small volume of ascites in the pelvis, not significantly changed.

The small volume of ascites identified previously posterior to the ascending colon has

resolved.

The wall thickening of the descending colon previously has resolved.



There are small bilateral pleural effusions, as described.

There is no bowel distention or obstruction.  No inflammatory changes in the mesentery.

The solid organs are unremarkable except for splenomegaly, unchanged.  There is no

intra splenic or perisplenic hematoma or free fluid.

The appendix is unremarkable and unchanged.





<Electronically signed by James Rosen > 08/11/21 [id number]

## 2021-08-12 VITALS — DIASTOLIC BLOOD PRESSURE: 90 MMHG | SYSTOLIC BLOOD PRESSURE: 137 MMHG

## 2021-08-12 VITALS — SYSTOLIC BLOOD PRESSURE: 148 MMHG | DIASTOLIC BLOOD PRESSURE: 90 MMHG

## 2021-08-12 LAB
ALBUMIN SERPL BCG-MCNC: 3.4 GM/DL (ref 3.2–5.2)
ALT SERPL W P-5'-P-CCNC: 52 U/L (ref 12–78)
BASOPHILS # BLD AUTO: 0 10^3/UL (ref 0–0.2)
BASOPHILS NFR BLD AUTO: 0.5 % (ref 0–1)
BILIRUB SERPL-MCNC: 0.4 MG/DL (ref 0.2–1)
BUN SERPL-MCNC: 13 MG/DL (ref 7–18)
CALCIUM SERPL-MCNC: 8.9 MG/DL (ref 8.5–10.1)
CHLORIDE SERPL-SCNC: 108 MEQ/L (ref 98–107)
CK MB CFR.DF SERPL CALC: 3.33
CK MB SERPL-MCNC: < 1 NG/ML (ref ?–3.6)
CK SERPL-CCNC: 30 U/L (ref 39–308)
CO2 SERPL-SCNC: 28 MEQ/L (ref 21–32)
CREAT SERPL-MCNC: 1.03 MG/DL (ref 0.7–1.3)
CRP SERPL-MCNC: 0.71 MG/DL (ref 0–0.3)
EOSINOPHIL # BLD AUTO: 0.1 10^3/UL (ref 0–0.5)
EOSINOPHIL NFR BLD AUTO: 1.9 % (ref 0–3)
GFR SERPL CREATININE-BSD FRML MDRD: > 60 ML/MIN/{1.73_M2} (ref 60–?)
GLUCOSE SERPL-MCNC: 86 MG/DL (ref 70–100)
HCT VFR BLD AUTO: 40.2 % (ref 42–52)
HGB BLD-MCNC: 14.2 G/DL (ref 13.5–17.5)
LYMPHOCYTES # BLD AUTO: 1.3 10^3/UL (ref 1.5–5)
LYMPHOCYTES NFR BLD AUTO: 34.9 % (ref 24–44)
MAGNESIUM SERPL-MCNC: 1.6 MG/DL (ref 1.8–2.4)
MCH RBC QN AUTO: 31.3 PG (ref 27–33)
MCHC RBC AUTO-ENTMCNC: 35.3 G/DL (ref 32–36.5)
MCV RBC AUTO: 88.5 FL (ref 80–96)
MONOCYTES # BLD AUTO: 0.4 10^3/UL (ref 0–0.8)
MONOCYTES NFR BLD AUTO: 11.2 % (ref 2–8)
NEUTROPHILS # BLD AUTO: 1.9 10^3/UL (ref 1.5–8.5)
NEUTROPHILS NFR BLD AUTO: 51.2 % (ref 36–66)
PHOSPHATE SERPL-MCNC: 3.9 MG/DL (ref 2.5–4.9)
PLATELET # BLD AUTO: 155 10^3/UL (ref 150–450)
POTASSIUM SERPL-SCNC: 4 MEQ/L (ref 3.5–5.1)
PROT SERPL-MCNC: 6.3 GM/DL (ref 6.4–8.2)
RBC # BLD AUTO: 4.54 10^6/UL (ref 4.3–6.1)
SODIUM SERPL-SCNC: 141 MEQ/L (ref 136–145)
TROPONIN I SERPL-MCNC: < 0.02 NG/ML (ref ?–0.1)
WBC # BLD AUTO: 3.7 10^3/UL (ref 4–10)

## 2021-08-12 RX ADMIN — NICOTINE SCH PATCH: 14 PATCH, EXTENDED RELEASE TRANSDERMAL at 09:58

## 2021-08-12 RX ADMIN — DEXTROSE MONOHYDRATE SCH MG: 50 INJECTION, SOLUTION INTRAVENOUS at 09:58

## 2021-08-12 NOTE — IPNPDOC
Subjective


Date Seen


The patient was seen on 8/12/21.





Subjective


Chief Complaint/HPI


SUBJECTIVE: Pt seen at bedside rounds this morning. He is still experiencing 

loose, watery, nonbloody diarrhea about 2x /day. He does states that his 

abdominal pain has improved since yesterday and significantly improved this 

morning. He's able to tolerate diet and states that the food does help with this

abdominal pain. He denies any vomiting, difficulty or pain swallowing, or 

sensation of choking when eating. He also denies any CP, sob, lower extremity 

edema, fever, chills. 





OBJECTIVE:


VS: afebrile, Pulse 49, RR 18, bp 137/90 (106) 97% RA


General awake alert oriented appropriate mood and affect speech is clear able to

speak in full sentences without any accessory muscle use or retractions


HEENT sclera clear anicteric pupils equal round reactive to light mucous 

membrane moist no lesions seen tongue is midline


Neck/lymph supple no tracheal deviation or mass no evidence of bruit unable to 

palpate lymphadenopathy


Cardiac normal S1-S2 no significant audible murmur rubs or gallop no evidence of

elevated JVP no peripheral edema bilaterally


Pulmonary No wheezing rhonci rales


Abdomen: soft, nondistended, NBS. Still tenderness on deep palpation of both RLQ

and LLQ. No guarding 


Extremities no cyanosis clubbing or bruising or calf tenderness





Immunology: ARANZA, ANCA, anti-double stranded DNA antibody is negative.


Anti-glomerular basement membrane antibody is pending. Kappa lambda light chain


ratio is normal. Lyme disease titers are negative





IMAGING:


CTA abd/pelvis IMPRESSION: Small volume of ascites in pelvis, unchanged from robby

or. small bilateral pleural effusions.Resolved ascites in posterior to ascending

colon compared to prior. wall thickening of descending colon from previous 

imaging has resolved. Appendix unremarkable. No bowel distension or obstruction.

No inflammatory changes in mesentery





Abd XR: IMPRESSION: Possible splenoemgaly. No Bowel obstruction





Assessment and Plan: 


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 

eating a sandwich and it is associated with sporadic nausea and vomiting x 1. 

He's also had some episodes of nonbloody loose watery diarrhea and Cdiff was 

ordered and currently pending results. Patient had repeat ABD imaging with CTA 

which shows resolved ascites in posterior to ascending colon and wall thickening

of the descending colon also resolved from prior imaging. He still does have 

some small volume ascites in the pelvis. Pain significantly improved and is 

tolerating diet without exacerbation of pain. With light of his abdominal pain 

of unknown etiology at this point, will proceed with EGD and flexible 

sigmoidoscopy procedure this afternoon. I have discussed both procedures with 

him and discussed the risks and benefits.





Assessment /Plan


Plan/VTE


VTE Prophylaxis Ordered?:  Yes





VS, I&O, 24H, Fishbone


Vital Signs/I&O





Vital Signs








  Date Time  Temp Pulse Resp B/P (MAP) Pulse Ox O2 Delivery O2 Flow Rate FiO2


 


8/12/21 06:00 97.6 49 18 137/90 (106) 97 Room Air  


 


8/10/21 03:58       1.0 














I&O- Last 24 Hours up to 6 AM 


 


 8/12/21





 05:59


 


Intake Total 2020 ml


 


Output Total 3125 ml


 


Balance -1105 ml











Laboratory Data


24H LABS


Laboratory Tests 2


8/12/21 00:21: 


Total Creatine Kinase 30L, Creatine Kinase MB < 1.0, Creatine Kinase MB Relative

Index 3.33, Troponin I < 0.02


8/12/21 05:46: 


Immature Granulocyte % (Auto) 0.3, Neutrophils (%) (Auto) 51.2, Lymphocytes (%) 

(Auto) 34.9, Monocytes (%) (Auto) 11.2H, Eosinophils (%) (Auto) 1.9, Basophils 

(%) (Auto) 0.5, Neutrophils # (Auto) 1.9, Lymphocytes # (Auto) 1.3L, Monocytes #

(Auto) 0.4, Eosinophils # (Auto) 0.1, Basophils # (Auto) 0.0, Nucleated Red 

Blood Cells % (auto) 0.0, Anion Gap 5L, Glomerular Filtration Rate > 60.0, 

Calcium Level 8.9, Phosphorus Level 3.9, Magnesium Level 1.6L, Total Bilirubin 

0.4, Aspartate Amino Transf (AST/SGOT) 54H, Alanine Aminotransferase (ALT/SGPT) 

52, Alkaline Phosphatase 68, C-Reactive Protein, Quantitative 0.71H, Total 

Protein 6.3L, Albumin 3.4, Albumin/Globulin Ratio 1.2


CBC/BMP


Laboratory Tests


8/12/21 05:46








Microbiology





Microbiology


8/7/21 Blood Culture - Final, Complete


         NO GROWTH AFTER 5 DAYS


8/7/21 Blood Culture - Final, Complete


         NO GROWTH AFTER 5 DAYS


8/7/21 Urine Culture - Final, Complete





GME ATTESTATION


GME ATTESTATION


My faculty preceptor for this patient encounter was physically present during 

the encounter and was fully available. All aspects of the patient interview, 

examination, medical decision making process, and medical care plan development 

were reviewed and approved by the faculty preceptor. The faculty preceptor is 

aware and concurs with the plan as stated in the body of this note and will 

attest to such by his/her cosignature.











Miriam Resendiz DO                Aug 12, 2021 11:37

## 2021-08-12 NOTE — DS.PDOC
Discharge Summary


General


Date of Admission


Aug 7, 2021 at 02:43


Date of Discharge


8/12/2021





Discharge Summary


PROCEDURES PERFORMED DURING STAY: 


[None].





ADMITTING DIAGNOSES / DISCHARGE DIAGNOSES:


Abdominal pain - possibly 2/2 viral gastroenteritis, no evidence of colitis, 

unlikely 2/2 UTI


s/p JACOB - likely 2/2 AIN / ATN


s/p Thrombocytopenia


EBV infection (Likely prior)


Bradycardia 


s/p Rhabdomyolysis 


Nicotine use


GI prophylaxis


DVT prophylaxis





COMPLICATIONS/CHIEF COMPLAINT: 


Abdominal pain 





HISTORY OF PRESENT ILLNESS: 


Patient is 20 year-old  male with no significant past medical history 

who presented to the emergency room with complaints of abdominal discomfort. 

Patient was found to have an elevated creatinine was admitted to the hospital 

service for further evaluation and treatment. Nephrology was called on 

consultation.





Patient was seen and examined at the bedside. Denies any nausea, vomiting, CP, 

SOB, palpitations, or urinary discomfort. Has noted improvement of abdominal pa

in. 





HOSPITAL COURSE: 


Abdominal pain - possibly 2/2 viral gastroenteritis, no evidence of colitis, 

unlikely 2/2 UTI


- Improvement of abdominal pain 


- Afebrile / Hemodynamically stable


- No leukocytosis


- Gonorrhoeae / Chlamydia negative 


- C. diff PCR negative 


- Imaging noted above 


- s/p Ceftriaxone and Flagyl 


- GI on consultation; appreciate their input


- Will have outpatient follow up with PCP and GI within 7 days. 





s/p JACOB - likely 2/2 AIN / ATN


- Cr has trended down to normal 


- Will avoid Nephrotoxic medications


- Nephrology on consultation; appreciate their input 


- Will have outpatient follow up with Nephrology within 7 days. 





s/p Thrombocytopenia


- Improving 


- There has been no evidence of bleeding


- Hepatitis / HIV negative 





EBV infection (Likely prior)


- Serology reveals IgG positive, but not IgM


- Monospot test negative 


- Imaging consistent with splenomegaly


- c/w Supportive care 


- Will avoid strenuous activity for 2 weeks; provided with work note stating 

same 





Bradycardia 


- Patient has experienced bradycardia at night and early AM; improves when awake


- Denies any CP, SOB or palpitations


- EKG again reviewed





s/p Rhabdomyolysis 





Nicotine use


- c/w Nicotine patch 





GI prophylaxis


- c/w Protonix 





DVT prophylaxis


- c/w Heparin 





DISCHARGE MEDICATIONS: 


Please see below.


 


ALLERGIES: 


Please see below.





PHYSICAL EXAMINATION ON DISCHARGE:


Vitals (See below)


General: Sitting up in bed, appears comfortable, not in any acute distress, 

AAOx3


HEENT: AT, and normocephalic 


CVS: +S1S2


Lungs: Fair bilaterally without any wheezing / rhonchi / rales 


Abdomen: soft, without distention, mild tenderness 


Extremities: No edema 





LABORATORY DATA: 


Please see below.





IMAGING: 


CT abdomen / pelvis 8/7:


1. Right renal and upper ureteral adjacent fat stranding, correlate with 

urinalysis for pyelonephritis. 


2. Normal appendix on coronal image 40. 


3. Splenomegaly, measures 15 cm. 





Renal US 8/7:


1. Bilateral echogenic kidneys, consistent with medical-renal disease. 


2. No hydronephrosis. 





CT abdomen / pelvis 8/8:


The appendix remains unremarkable.


There are small bilateral pleural effusions as an interval change.


There is a small volume of ascites in the pelvis as an interval change.  There 

is a small volume of ascites posterior to the ascending colon as an interval 

change.


There is focal wall thickening in the mid descending colon, nonspecific, 

possibly infectious versus inflammatory colitis.


There are 3 collections of tiny air bubbles in the anterior abdominal wall just 

slightly lateral slightly inferior to the umbilicus, not present previously, 

possibly from abdominal wall injections.


There is no pneumoperitoneum.





XR abdomen 8/11:


1. Possible splenomegaly. 


2. No radiographic evidence of bowel obstruction. 





CTA abdomen / pelvis 8/11:


The abdominal and pelvic arteries are unremarkable.  There is no evidence of 

occlusion or stenosis as described above.


There is a small volume of ascites in the pelvis, not significantly changed.


The small volume of ascites identified previously posterior to the ascending col

on has resolved.


The wall thickening of the descending colon previously has resolved.


There are small bilateral pleural effusions, as described.


There is no bowel distention or obstruction.  No inflammatory changes in the 

mesentery.


The solid organs are unremarkable except for splenomegaly, unchanged.  There is 

no intra splenic or perisplenic hematoma or free fluid.


The appendix is unremarkable and unchanged.





ACTIVITY: 


[As tolerated].





DISCHARGE PLAN:


Follow up with PCP, nephrology and GI within 7 days


Remain compliant with treatment plan and medications


Avoid any strenuous activity for 2 weeks 


Return to the ER if you experience any problems. 





DISPOSITION:


Home 





DISCHARGE CONDITION: 


[Stable].





TIME SPENT ON DISCHARGE: 


35 minutes.





Vital Signs/I&Os





Vital Signs








  Date Time  Temp Pulse Resp B/P (MAP) Pulse Ox O2 Delivery O2 Flow Rate FiO2


 


8/12/21 14:00 97.8 37 17 148/90 (109) 96 Room Air  


 


8/10/21 03:58       1.0 














I&O- Last 24 Hours up to 6 AM 


 


 8/12/21





 06:00


 


Intake Total 2020 ml


 


Output Total 2475 ml


 


Balance -455 ml











Laboratory Data


Labs 24H


Laboratory Tests 2


8/12/21 00:21: 


Total Creatine Kinase 30L, Creatine Kinase MB < 1.0, Creatine Kinase MB Relative

Index 3.33, Troponin I < 0.02


8/12/21 05:46: 


Immature Granulocyte % (Auto) 0.3, Neutrophils (%) (Auto) 51.2, Lymphocytes (%) 

(Auto) 34.9, Monocytes (%) (Auto) 11.2H, Eosinophils (%) (Auto) 1.9, Basophils 

(%) (Auto) 0.5, Neutrophils # (Auto) 1.9, Lymphocytes # (Auto) 1.3L, Monocytes #

(Auto) 0.4, Eosinophils # (Auto) 0.1, Basophils # (Auto) 0.0, Nucleated Red 

Blood Cells % (auto) 0.0, Anion Gap 5L, Glomerular Filtration Rate > 60.0, 

Calcium Level 8.9, Phosphorus Level 3.9, Magnesium Level 1.6L, Total Bilirubin 

0.4, Aspartate Amino Transf (AST/SGOT) 54H, Alanine Aminotransferase (ALT/SGPT) 

52, Alkaline Phosphatase 68, C-Reactive Protein, Quantitative 0.71H, Total 

Protein 6.3L, Albumin 3.4, Albumin/Globulin Ratio 1.2


CBC/BMP


Laboratory Tests


8/12/21 05:46











Microbiology





Microbiology


8/7/21 Blood Culture - Final, Complete


         NO GROWTH AFTER 5 DAYS


8/7/21 Blood Culture - Final, Complete


         NO GROWTH AFTER 5 DAYS


8/7/21 Urine Culture - Final, Complete





Discharge Medications


Scheduled


Omeprazole (Omeprazole) 40 Mg Capsule.dr, 1 CAP PO DAILY





Scheduled PRN


Dicyclomine HCl (Dicyclomine HCl) 10 Mg Capsule, 1 CAP PO Q6HP PRN for irritable

bowel symptoms





Allergies


Coded Allergies:  


     No Known Allergies (Unverified , 8/6/21)











JUANCARLOS HEATON MD                Aug 12, 2021 14:55

## 2021-08-12 NOTE — ECGEPIP
[facility]

                                       

                                       Test Date:    2021

Pat Name:     BONG BALES            Department:   

Patient ID:   [id number]                 Room:         [place] [address]

Gender:       Male                     Technician:   CHUN

:          2000               Requested By: ALEXA DOUGLAS

Order Number: [id number]     Reading MD:   Artis Pereira

                                 Measurements

Intervals                              Axis          

Rate:         38                       P:            6

FL:           164                      QRS:          63

QRSD:         96                       T:            40

QT:           464                                    

QTc:          368                                    

                           Interpretive Statements

marked sinus bradycardia

Somewhat slow R wave progression

Different precordial lead placement from earlier the same day

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

Electronically Signed on 2021 13:42:52 EDT by Artis Pereira

## 2021-08-12 NOTE — ECGEPIP
[facility]

                                       

                                       Test Date:    2021

Pat Name:     BONG BALES            Department:   

Patient ID:   [id number]                 Room:         [place] [address]

Gender:       Male                     Technician:   ARCHANA

:          2000               Requested By: ALEXA DOUGLAS

Order Number: [id number]     Reading MD:   Artis Pereira

                                 Measurements

Intervals                              Axis          

Rate:         38                       P:            

MA:                                    QRS:          65

QRSD:         88                       T:            46

QT:           452                                    

QTc:          359                                    

                           Interpretive Statements

marked sinus bradycardia with intermittent junctional escape rhythm

Incomplete right bundle branch block

Findings suggestive of an athletic heart

Slower rate than the previous day

Electronically Signed on 2021 8:15:52 EDT by Artis Pereira

## 2021-08-13 LAB
B BURGDOR IGG+IGM SER-ACNC: <0.91 ISR (ref 0–0.9)
B BURGDOR IGM SER IA-ACNC: <0.8 INDEX (ref 0–0.79)